# Patient Record
Sex: MALE | Race: WHITE | NOT HISPANIC OR LATINO
[De-identification: names, ages, dates, MRNs, and addresses within clinical notes are randomized per-mention and may not be internally consistent; named-entity substitution may affect disease eponyms.]

---

## 2018-04-10 ENCOUNTER — RECORD ABSTRACTING (OUTPATIENT)
Age: 53
End: 2018-04-10

## 2018-04-10 DIAGNOSIS — Z82.49 FAMILY HISTORY OF ISCHEMIC HEART DISEASE AND OTHER DISEASES OF THE CIRCULATORY SYSTEM: ICD-10-CM

## 2018-04-10 DIAGNOSIS — E78.2 MIXED HYPERLIPIDEMIA: ICD-10-CM

## 2018-04-10 DIAGNOSIS — Z87.09 PERSONAL HISTORY OF OTHER DISEASES OF THE RESPIRATORY SYSTEM: ICD-10-CM

## 2018-04-10 DIAGNOSIS — R53.83 OTHER MALAISE: ICD-10-CM

## 2018-04-10 DIAGNOSIS — Z87.891 PERSONAL HISTORY OF NICOTINE DEPENDENCE: ICD-10-CM

## 2018-04-10 DIAGNOSIS — R53.81 OTHER MALAISE: ICD-10-CM

## 2018-04-10 DIAGNOSIS — Z86.39 PERSONAL HISTORY OF OTHER ENDOCRINE, NUTRITIONAL AND METABOLIC DISEASE: ICD-10-CM

## 2018-04-13 ENCOUNTER — NON-APPOINTMENT (OUTPATIENT)
Age: 53
End: 2018-04-13

## 2018-04-13 ENCOUNTER — APPOINTMENT (OUTPATIENT)
Dept: FAMILY MEDICINE | Facility: CLINIC | Age: 53
End: 2018-04-13
Payer: COMMERCIAL

## 2018-04-13 VITALS
HEART RATE: 82 BPM | WEIGHT: 197 LBS | TEMPERATURE: 98 F | HEIGHT: 66 IN | BODY MASS INDEX: 31.66 KG/M2 | RESPIRATION RATE: 16 BRPM | DIASTOLIC BLOOD PRESSURE: 90 MMHG | OXYGEN SATURATION: 98 % | SYSTOLIC BLOOD PRESSURE: 142 MMHG

## 2018-04-13 VITALS — SYSTOLIC BLOOD PRESSURE: 130 MMHG | DIASTOLIC BLOOD PRESSURE: 80 MMHG

## 2018-04-13 LAB
ALBUMIN: 30
BILIRUB UR QL STRIP: NORMAL
CLARITY UR: CLEAR
COLLECTION METHOD: NORMAL
CREATININE: 300
GLUCOSE UR-MCNC: NORMAL
HCG UR QL: 0.2 EU/DL
HGB UR QL STRIP.AUTO: NORMAL
KETONES UR-MCNC: NORMAL
LEUKOCYTE ESTERASE UR QL STRIP: NORMAL
MICROALBUMIN/CREAT UR TEST STR-RTO: <30
NITRITE UR QL STRIP: NORMAL
PH UR STRIP: 8.5
PROT UR STRIP-MCNC: NORMAL
SP GR UR STRIP: 1.01

## 2018-04-13 PROCEDURE — 99215 OFFICE O/P EST HI 40 MIN: CPT | Mod: 25

## 2018-04-13 PROCEDURE — 94010 BREATHING CAPACITY TEST: CPT

## 2018-04-13 PROCEDURE — 93000 ELECTROCARDIOGRAM COMPLETE: CPT

## 2018-04-13 PROCEDURE — 81003 URINALYSIS AUTO W/O SCOPE: CPT | Mod: QW

## 2018-04-13 PROCEDURE — 81002 URINALYSIS NONAUTO W/O SCOPE: CPT

## 2018-04-13 PROCEDURE — 82044 UR ALBUMIN SEMIQUANTITATIVE: CPT | Mod: QW

## 2018-04-13 PROCEDURE — 82570 ASSAY OF URINE CREATININE: CPT | Mod: QW

## 2018-04-13 PROCEDURE — 36415 COLL VENOUS BLD VENIPUNCTURE: CPT

## 2018-04-14 LAB
ALBUMIN SERPL ELPH-MCNC: 4.9 G/DL
ALP BLD-CCNC: 80 U/L
ALT SERPL-CCNC: 24 U/L
ANION GAP SERPL CALC-SCNC: 16 MMOL/L
AST SERPL-CCNC: 21 U/L
BASOPHILS # BLD AUTO: 0.04 K/UL
BASOPHILS NFR BLD AUTO: 0.5 %
BILIRUB SERPL-MCNC: 0.5 MG/DL
BUN SERPL-MCNC: 13 MG/DL
CALCIUM SERPL-MCNC: 9.4 MG/DL
CHLORIDE SERPL-SCNC: 101 MMOL/L
CHOLEST SERPL-MCNC: 177 MG/DL
CHOLEST/HDLC SERPL: 4.5 RATIO
CO2 SERPL-SCNC: 26 MMOL/L
CREAT SERPL-MCNC: 0.83 MG/DL
EOSINOPHIL # BLD AUTO: 0.09 K/UL
EOSINOPHIL NFR BLD AUTO: 1.1 %
FOLATE SERPL-MCNC: >20 NG/ML
GLUCOSE SERPL-MCNC: 87 MG/DL
HBA1C MFR BLD HPLC: 5.4 %
HCT VFR BLD CALC: 50.4 %
HDLC SERPL-MCNC: 39 MG/DL
HGB BLD-MCNC: 16.8 G/DL
IMM GRANULOCYTES NFR BLD AUTO: 0.2 %
LDLC SERPL CALC-MCNC: 96 MG/DL
LYMPHOCYTES # BLD AUTO: 1.91 K/UL
LYMPHOCYTES NFR BLD AUTO: 23.8 %
MAN DIFF?: NORMAL
MCHC RBC-ENTMCNC: 29.1 PG
MCHC RBC-ENTMCNC: 33.3 GM/DL
MCV RBC AUTO: 87.2 FL
MONOCYTES # BLD AUTO: 0.74 K/UL
MONOCYTES NFR BLD AUTO: 9.2 %
NEUTROPHILS # BLD AUTO: 5.23 K/UL
NEUTROPHILS NFR BLD AUTO: 65.2 %
PLATELET # BLD AUTO: 245 K/UL
POTASSIUM SERPL-SCNC: 4.4 MMOL/L
PROT SERPL-MCNC: 7.5 G/DL
PSA FREE SERPL-MCNC: 0.13 NG/ML
RBC # BLD: 5.78 M/UL
RBC # FLD: 14 %
SODIUM SERPL-SCNC: 143 MMOL/L
T3FREE SERPL-MCNC: 4.16 PG/ML
T4 FREE SERPL-MCNC: 1.4 NG/DL
TRIGL SERPL-MCNC: 210 MG/DL
TSH SERPL-ACNC: 4.93 UIU/ML
VIT B12 SERPL-MCNC: 921 PG/ML
WBC # FLD AUTO: 8.03 K/UL

## 2018-04-19 ENCOUNTER — APPOINTMENT (OUTPATIENT)
Dept: FAMILY MEDICINE | Facility: CLINIC | Age: 53
End: 2018-04-19
Payer: COMMERCIAL

## 2018-04-19 VITALS
BODY MASS INDEX: 31.66 KG/M2 | HEIGHT: 66 IN | TEMPERATURE: 98.2 F | DIASTOLIC BLOOD PRESSURE: 78 MMHG | RESPIRATION RATE: 12 BRPM | SYSTOLIC BLOOD PRESSURE: 128 MMHG | OXYGEN SATURATION: 98 % | WEIGHT: 197 LBS | HEART RATE: 100 BPM

## 2018-04-19 PROCEDURE — 99213 OFFICE O/P EST LOW 20 MIN: CPT

## 2018-04-19 PROCEDURE — 76536 US EXAM OF HEAD AND NECK: CPT

## 2018-05-22 ENCOUNTER — APPOINTMENT (OUTPATIENT)
Dept: FAMILY MEDICINE | Facility: CLINIC | Age: 53
End: 2018-05-22
Payer: COMMERCIAL

## 2018-05-22 VITALS
HEART RATE: 72 BPM | HEIGHT: 66 IN | OXYGEN SATURATION: 98 % | WEIGHT: 197 LBS | TEMPERATURE: 98.1 F | RESPIRATION RATE: 12 BRPM | SYSTOLIC BLOOD PRESSURE: 142 MMHG | DIASTOLIC BLOOD PRESSURE: 72 MMHG | BODY MASS INDEX: 31.66 KG/M2

## 2018-05-22 PROCEDURE — 99213 OFFICE O/P EST LOW 20 MIN: CPT | Mod: 25

## 2018-05-22 PROCEDURE — 36415 COLL VENOUS BLD VENIPUNCTURE: CPT

## 2018-05-22 NOTE — PHYSICAL EXAM
[No Acute Distress] : no acute distress [Well Nourished] : well nourished [Well Developed] : well developed [Thyroid Normal, No Nodules] : the thyroid was normal and there were no nodules present [No Respiratory Distress] : no respiratory distress  [Clear to Auscultation] : lungs were clear to auscultation bilaterally [No Accessory Muscle Use] : no accessory muscle use [Normal Rate] : normal rate  [Regular Rhythm] : with a regular rhythm [Normal S1, S2] : normal S1 and S2 [Normal Affect] : the affect was normal [Normal Insight/Judgement] : insight and judgment were intact

## 2018-05-22 NOTE — HISTORY OF PRESENT ILLNESS
[FreeTextEntry1] : pt here for a recheck  [de-identified] : 53 year old male here for recheck of thyroid and to review US denies complaint.

## 2018-05-22 NOTE — PLAN
[FreeTextEntry1] : labs repeated.\par labs reviewed\par meds as directed\par will call with results

## 2018-05-24 LAB
25(OH)D3 SERPL-MCNC: 51.3 NG/ML
TSH SERPL-ACNC: 4.92 UIU/ML

## 2018-05-29 ENCOUNTER — RX RENEWAL (OUTPATIENT)
Age: 53
End: 2018-05-29

## 2018-05-30 ENCOUNTER — RX RENEWAL (OUTPATIENT)
Age: 53
End: 2018-05-30

## 2018-07-13 ENCOUNTER — RX RENEWAL (OUTPATIENT)
Age: 53
End: 2018-07-13

## 2018-08-21 ENCOUNTER — APPOINTMENT (OUTPATIENT)
Dept: FAMILY MEDICINE | Facility: CLINIC | Age: 53
End: 2018-08-21
Payer: COMMERCIAL

## 2018-08-21 VITALS
SYSTOLIC BLOOD PRESSURE: 144 MMHG | OXYGEN SATURATION: 97 % | TEMPERATURE: 98.2 F | WEIGHT: 197 LBS | DIASTOLIC BLOOD PRESSURE: 88 MMHG | RESPIRATION RATE: 14 BRPM | HEART RATE: 95 BPM | HEIGHT: 66 IN | BODY MASS INDEX: 31.66 KG/M2

## 2018-08-21 PROCEDURE — 99213 OFFICE O/P EST LOW 20 MIN: CPT | Mod: 25

## 2018-08-21 PROCEDURE — 36415 COLL VENOUS BLD VENIPUNCTURE: CPT

## 2018-08-21 NOTE — HISTORY OF PRESENT ILLNESS
[FreeTextEntry1] : Rx refill on Levothyroxine.   [de-identified] : 53 year old male here for med refills. he is doing well and denies complaint. He has not gone for a colonoscopy and states that he does not want to .

## 2018-08-21 NOTE — PLAN
[FreeTextEntry1] : meds as directed\par cologuard. risk of no screening discussed\par labs drawn\par will call with lab results

## 2018-08-22 LAB
ALBUMIN SERPL ELPH-MCNC: 4.7 G/DL
ALP BLD-CCNC: 85 U/L
ALT SERPL-CCNC: 22 U/L
ANION GAP SERPL CALC-SCNC: 13 MMOL/L
AST SERPL-CCNC: 20 U/L
BASOPHILS # BLD AUTO: 0.02 K/UL
BASOPHILS NFR BLD AUTO: 0.3 %
BILIRUB SERPL-MCNC: 0.4 MG/DL
BUN SERPL-MCNC: 14 MG/DL
CALCIUM SERPL-MCNC: 9.7 MG/DL
CHLORIDE SERPL-SCNC: 101 MMOL/L
CHOLEST SERPL-MCNC: 162 MG/DL
CHOLEST/HDLC SERPL: 4.9 RATIO
CO2 SERPL-SCNC: 27 MMOL/L
CREAT SERPL-MCNC: 0.86 MG/DL
EOSINOPHIL # BLD AUTO: 0.05 K/UL
EOSINOPHIL NFR BLD AUTO: 0.7 %
GLUCOSE SERPL-MCNC: 138 MG/DL
HBA1C MFR BLD HPLC: 5.6 %
HCT VFR BLD CALC: 47.8 %
HDLC SERPL-MCNC: 33 MG/DL
HGB BLD-MCNC: 16 G/DL
IMM GRANULOCYTES NFR BLD AUTO: 0.3 %
LDLC SERPL CALC-MCNC: 78 MG/DL
LYMPHOCYTES # BLD AUTO: 1.88 K/UL
LYMPHOCYTES NFR BLD AUTO: 25.2 %
MAN DIFF?: NORMAL
MCHC RBC-ENTMCNC: 29.1 PG
MCHC RBC-ENTMCNC: 33.5 GM/DL
MCV RBC AUTO: 87.1 FL
MONOCYTES # BLD AUTO: 0.38 K/UL
MONOCYTES NFR BLD AUTO: 5.1 %
NEUTROPHILS # BLD AUTO: 5.1 K/UL
NEUTROPHILS NFR BLD AUTO: 68.4 %
PLATELET # BLD AUTO: 251 K/UL
POTASSIUM SERPL-SCNC: 4.1 MMOL/L
PROT SERPL-MCNC: 6.8 G/DL
RBC # BLD: 5.49 M/UL
RBC # FLD: 13.2 %
SODIUM SERPL-SCNC: 141 MMOL/L
T3 SERPL-MCNC: 114 NG/DL
T4 SERPL-MCNC: 7.4 UG/DL
TRIGL SERPL-MCNC: 253 MG/DL
TSH SERPL-ACNC: 2.61 UIU/ML
WBC # FLD AUTO: 7.45 K/UL

## 2018-11-14 ENCOUNTER — APPOINTMENT (OUTPATIENT)
Dept: FAMILY MEDICINE | Facility: CLINIC | Age: 53
End: 2018-11-14
Payer: COMMERCIAL

## 2018-11-14 VITALS
HEART RATE: 91 BPM | HEIGHT: 66 IN | RESPIRATION RATE: 16 BRPM | SYSTOLIC BLOOD PRESSURE: 124 MMHG | OXYGEN SATURATION: 98 % | DIASTOLIC BLOOD PRESSURE: 82 MMHG | TEMPERATURE: 97.9 F | BODY MASS INDEX: 31.18 KG/M2 | WEIGHT: 194 LBS

## 2018-11-14 PROCEDURE — 36415 COLL VENOUS BLD VENIPUNCTURE: CPT

## 2018-11-14 PROCEDURE — 99214 OFFICE O/P EST MOD 30 MIN: CPT | Mod: 25

## 2018-11-14 NOTE — HISTORY OF PRESENT ILLNESS
[FreeTextEntry1] : refill levothyroxine and simvastatin\par fbw\par [facility]  [de-identified] : the pt presents for FBW and refill of thyroid and cholesterol lowering medication. He feels well

## 2018-11-16 LAB
ALBUMIN SERPL ELPH-MCNC: 4.5 G/DL
ALP BLD-CCNC: 85 U/L
ALT SERPL-CCNC: 23 U/L
ANION GAP SERPL CALC-SCNC: 12 MMOL/L
AST SERPL-CCNC: 18 U/L
BILIRUB SERPL-MCNC: 0.5 MG/DL
BUN SERPL-MCNC: 13 MG/DL
CALCIUM SERPL-MCNC: 9.3 MG/DL
CHLORIDE SERPL-SCNC: 104 MMOL/L
CHOLEST SERPL-MCNC: 174 MG/DL
CHOLEST/HDLC SERPL: 5 RATIO
CO2 SERPL-SCNC: 27 MMOL/L
CREAT SERPL-MCNC: 0.77 MG/DL
GLUCOSE SERPL-MCNC: 102 MG/DL
HDLC SERPL-MCNC: 35 MG/DL
LDLC SERPL CALC-MCNC: 94 MG/DL
POTASSIUM SERPL-SCNC: 4.4 MMOL/L
PROT SERPL-MCNC: 6.9 G/DL
SODIUM SERPL-SCNC: 143 MMOL/L
T4 SERPL-MCNC: 8.5 UG/DL
TRIGL SERPL-MCNC: 225 MG/DL
TSH SERPL-ACNC: 0.27 UIU/ML

## 2018-11-21 LAB
TESTOST BND SERPL-MCNC: 17.5 PG/ML
TESTOST SERPL-MCNC: 228.3 NG/DL

## 2019-02-15 ENCOUNTER — RX RENEWAL (OUTPATIENT)
Age: 54
End: 2019-02-15

## 2019-02-20 ENCOUNTER — RX RENEWAL (OUTPATIENT)
Age: 54
End: 2019-02-20

## 2019-02-21 ENCOUNTER — NON-APPOINTMENT (OUTPATIENT)
Age: 54
End: 2019-02-21

## 2019-02-21 ENCOUNTER — APPOINTMENT (OUTPATIENT)
Dept: FAMILY MEDICINE | Facility: CLINIC | Age: 54
End: 2019-02-21
Payer: COMMERCIAL

## 2019-02-21 VITALS
WEIGHT: 190 LBS | DIASTOLIC BLOOD PRESSURE: 90 MMHG | HEART RATE: 67 BPM | HEIGHT: 66 IN | TEMPERATURE: 97.8 F | BODY MASS INDEX: 30.53 KG/M2 | RESPIRATION RATE: 16 BRPM | OXYGEN SATURATION: 97 % | SYSTOLIC BLOOD PRESSURE: 140 MMHG

## 2019-02-21 DIAGNOSIS — Z82.49 FAMILY HISTORY OF ISCHEMIC HEART DISEASE AND OTHER DISEASES OF THE CIRCULATORY SYSTEM: ICD-10-CM

## 2019-02-21 DIAGNOSIS — Z78.9 OTHER SPECIFIED HEALTH STATUS: ICD-10-CM

## 2019-02-21 PROCEDURE — 81003 URINALYSIS AUTO W/O SCOPE: CPT | Mod: NC,QW

## 2019-02-21 PROCEDURE — 36415 COLL VENOUS BLD VENIPUNCTURE: CPT

## 2019-02-21 PROCEDURE — 73030 X-RAY EXAM OF SHOULDER: CPT | Mod: 59,LT

## 2019-02-21 PROCEDURE — 93000 ELECTROCARDIOGRAM COMPLETE: CPT

## 2019-02-21 PROCEDURE — 99215 OFFICE O/P EST HI 40 MIN: CPT | Mod: 25

## 2019-02-21 NOTE — HISTORY OF PRESENT ILLNESS
[FreeTextEntry1] : Pt is here for BW and to discuss things with the physician Rx renewal: Levothyoxine 25 MG , simvastatin 20 MG\[facility]  [de-identified] : The pt presents for FBW and refill of thyroid, chol meds. He states he has white coat syndrome and is nervous\par He c/o right foot pain at work x weeks and intermittent mild to moderate left shoulder pain with lifting x months. + improved w Aleve\par Pt also c/o urinary frequency  every february. He denies dysuria

## 2019-02-21 NOTE — PHYSICAL EXAM
[No Acute Distress] : no acute distress [Well Nourished] : well nourished [Well Developed] : well developed [Well-Appearing] : well-appearing [Normal Sclera/Conjunctiva] : normal sclera/conjunctiva [PERRL] : pupils equal round and reactive to light [EOMI] : extraocular movements intact [Normal Outer Ear/Nose] : the outer ears and nose were normal in appearance [Normal Oropharynx] : the oropharynx was normal [No JVD] : no jugular venous distention [Supple] : supple [No Lymphadenopathy] : no lymphadenopathy [Thyroid Normal, No Nodules] : the thyroid was normal and there were no nodules present [No Respiratory Distress] : no respiratory distress  [Clear to Auscultation] : lungs were clear to auscultation bilaterally [No Accessory Muscle Use] : no accessory muscle use [Normal Rate] : normal rate  [Regular Rhythm] : with a regular rhythm [Normal S1, S2] : normal S1 and S2 [No Murmur] : no murmur heard [No Carotid Bruits] : no carotid bruits [No Abdominal Bruit] : a ~M bruit was not heard ~T in the abdomen [No Varicosities] : no varicosities [Pedal Pulses Present] : the pedal pulses are present [No Edema] : there was no peripheral edema [No Extremity Clubbing/Cyanosis] : no extremity clubbing/cyanosis [No Palpable Aorta] : no palpable aorta [Normal Posterior Cervical Nodes] : no posterior cervical lymphadenopathy [Normal Anterior Cervical Nodes] : no anterior cervical lymphadenopathy [No CVA Tenderness] : no CVA  tenderness [No Spinal Tenderness] : no spinal tenderness [No Joint Swelling] : no joint swelling [Grossly Normal Strength/Tone] : grossly normal strength/tone [No Rash] : no rash [Normal Gait] : normal gait [Coordination Grossly Intact] : coordination grossly intact [No Focal Deficits] : no focal deficits [Normal Affect] : the affect was normal [Normal Insight/Judgement] : insight and judgment were intact [de-identified] : repeat /96 [de-identified] : mild anterior left shr tenderness. FROM good strenghth

## 2019-02-22 ENCOUNTER — RX RENEWAL (OUTPATIENT)
Age: 54
End: 2019-02-22

## 2019-02-22 LAB
ALBUMIN SERPL ELPH-MCNC: 4.7 G/DL
ALP BLD-CCNC: 94 U/L
ALT SERPL-CCNC: 30 U/L
ANION GAP SERPL CALC-SCNC: 18 MMOL/L
AST SERPL-CCNC: 21 U/L
BILIRUB SERPL-MCNC: 0.4 MG/DL
BUN SERPL-MCNC: 17 MG/DL
CALCIUM SERPL-MCNC: 9.6 MG/DL
CHLORIDE SERPL-SCNC: 104 MMOL/L
CO2 SERPL-SCNC: 23 MMOL/L
CREAT SERPL-MCNC: 0.83 MG/DL
GLUCOSE SERPL-MCNC: 110 MG/DL
POTASSIUM SERPL-SCNC: 4.4 MMOL/L
PROT SERPL-MCNC: 7.1 G/DL
SODIUM SERPL-SCNC: 145 MMOL/L

## 2019-02-24 LAB
CHOLEST SERPL-MCNC: 182 MG/DL
CHOLEST/HDLC SERPL: 5.1 RATIO
HDLC SERPL-MCNC: 36 MG/DL
LDLC SERPL CALC-MCNC: 96 MG/DL
PSA SERPL-MCNC: 0.41 NG/ML
TRIGL SERPL-MCNC: 252 MG/DL

## 2019-04-13 ENCOUNTER — APPOINTMENT (OUTPATIENT)
Dept: ULTRASOUND IMAGING | Facility: CLINIC | Age: 54
End: 2019-04-13
Payer: COMMERCIAL

## 2019-04-13 PROCEDURE — 76700 US EXAM ABDOM COMPLETE: CPT

## 2019-04-19 ENCOUNTER — OUTPATIENT (OUTPATIENT)
Dept: OUTPATIENT SERVICES | Facility: HOSPITAL | Age: 54
LOS: 1 days | End: 2019-04-19

## 2019-04-26 ENCOUNTER — APPOINTMENT (OUTPATIENT)
Dept: CT IMAGING | Facility: CLINIC | Age: 54
End: 2019-04-26
Payer: COMMERCIAL

## 2019-04-26 PROCEDURE — Q9967B: CUSTOM

## 2019-04-26 PROCEDURE — 74178 CT ABD&PLV WO CNTR FLWD CNTR: CPT

## 2019-05-14 ENCOUNTER — RX RENEWAL (OUTPATIENT)
Age: 54
End: 2019-05-14

## 2019-08-19 ENCOUNTER — RX RENEWAL (OUTPATIENT)
Age: 54
End: 2019-08-19

## 2019-08-27 ENCOUNTER — APPOINTMENT (OUTPATIENT)
Dept: FAMILY MEDICINE | Facility: CLINIC | Age: 54
End: 2019-08-27
Payer: COMMERCIAL

## 2019-08-27 ENCOUNTER — LABORATORY RESULT (OUTPATIENT)
Age: 54
End: 2019-08-27

## 2019-08-27 VITALS
SYSTOLIC BLOOD PRESSURE: 138 MMHG | RESPIRATION RATE: 16 BRPM | DIASTOLIC BLOOD PRESSURE: 98 MMHG | WEIGHT: 193 LBS | OXYGEN SATURATION: 98 % | HEART RATE: 95 BPM | TEMPERATURE: 98.4 F | BODY MASS INDEX: 31.02 KG/M2 | HEIGHT: 66 IN

## 2019-08-27 PROCEDURE — 36415 COLL VENOUS BLD VENIPUNCTURE: CPT

## 2019-08-27 PROCEDURE — 99214 OFFICE O/P EST MOD 30 MIN: CPT | Mod: 25

## 2019-08-27 NOTE — HEALTH RISK ASSESSMENT
[Yes] : Yes [1 or 2 (0 pts)] : 1 or 2 (0 points) [Monthly or less (1 pt)] : Monthly or less (1 point) [No] : In the past 12 months have you used drugs other than those required for medical reasons? No [Never (0 pts)] : Never (0 points) [0] : 2) Feeling down, depressed, or hopeless: Not at all (0) [Audit-CScore] : 1

## 2019-08-27 NOTE — PHYSICAL EXAM
[No Acute Distress] : no acute distress [Well Nourished] : well nourished [Well Developed] : well developed [Well-Appearing] : well-appearing [Normal Sclera/Conjunctiva] : normal sclera/conjunctiva [PERRL] : pupils equal round and reactive to light [EOMI] : extraocular movements intact [Normal Outer Ear/Nose] : the outer ears and nose were normal in appearance [Normal Oropharynx] : the oropharynx was normal [No JVD] : no jugular venous distention [No Lymphadenopathy] : no lymphadenopathy [Supple] : supple [Thyroid Normal, No Nodules] : the thyroid was normal and there were no nodules present [No Respiratory Distress] : no respiratory distress  [No Accessory Muscle Use] : no accessory muscle use [Clear to Auscultation] : lungs were clear to auscultation bilaterally [Normal Rate] : normal rate  [Regular Rhythm] : with a regular rhythm [Normal S1, S2] : normal S1 and S2 [No Murmur] : no murmur heard [No Carotid Bruits] : no carotid bruits [No Abdominal Bruit] : a ~M bruit was not heard ~T in the abdomen [No Varicosities] : no varicosities [Pedal Pulses Present] : the pedal pulses are present [No Edema] : there was no peripheral edema [No Palpable Aorta] : no palpable aorta [No Extremity Clubbing/Cyanosis] : no extremity clubbing/cyanosis [Soft] : abdomen soft [Non Tender] : non-tender [Non-distended] : non-distended [No Masses] : no abdominal mass palpated [No HSM] : no HSM [Normal Bowel Sounds] : normal bowel sounds [Normal Posterior Cervical Nodes] : no posterior cervical lymphadenopathy [Normal Anterior Cervical Nodes] : no anterior cervical lymphadenopathy [No Spinal Tenderness] : no spinal tenderness [No CVA Tenderness] : no CVA  tenderness [No Joint Swelling] : no joint swelling [Grossly Normal Strength/Tone] : grossly normal strength/tone [No Rash] : no rash [Coordination Grossly Intact] : coordination grossly intact [No Focal Deficits] : no focal deficits [Normal Gait] : normal gait [Deep Tendon Reflexes (DTR)] : deep tendon reflexes were 2+ and symmetric [Normal Affect] : the affect was normal [Normal Insight/Judgement] : insight and judgment were intact

## 2019-08-28 LAB
ALBUMIN SERPL ELPH-MCNC: 4.8 G/DL
ALP BLD-CCNC: 96 U/L
ALT SERPL-CCNC: 24 U/L
ANION GAP SERPL CALC-SCNC: 11 MMOL/L
AST SERPL-CCNC: 19 U/L
BILIRUB SERPL-MCNC: 0.5 MG/DL
BUN SERPL-MCNC: 12 MG/DL
CALCIUM SERPL-MCNC: 9.1 MG/DL
CHLORIDE SERPL-SCNC: 103 MMOL/L
CHOLEST SERPL-MCNC: 182 MG/DL
CHOLEST/HDLC SERPL: 5.4 RATIO
CO2 SERPL-SCNC: 26 MMOL/L
CREAT SERPL-MCNC: 0.85 MG/DL
GLUCOSE SERPL-MCNC: 100 MG/DL
HDLC SERPL-MCNC: 34 MG/DL
LDLC SERPL CALC-MCNC: 106 MG/DL
POTASSIUM SERPL-SCNC: 4.1 MMOL/L
PROT SERPL-MCNC: 7.2 G/DL
SODIUM SERPL-SCNC: 140 MMOL/L
TRIGL SERPL-MCNC: 209 MG/DL
TSH SERPL-ACNC: 5.22 UIU/ML

## 2019-11-18 ENCOUNTER — RX RENEWAL (OUTPATIENT)
Age: 54
End: 2019-11-18

## 2019-11-19 ENCOUNTER — RX RENEWAL (OUTPATIENT)
Age: 54
End: 2019-11-19

## 2019-12-13 ENCOUNTER — APPOINTMENT (OUTPATIENT)
Dept: UROLOGY | Facility: CLINIC | Age: 54
End: 2019-12-13
Payer: COMMERCIAL

## 2019-12-13 VITALS
HEIGHT: 66 IN | WEIGHT: 193 LBS | TEMPERATURE: 98 F | SYSTOLIC BLOOD PRESSURE: 157 MMHG | BODY MASS INDEX: 31.02 KG/M2 | HEART RATE: 103 BPM | DIASTOLIC BLOOD PRESSURE: 90 MMHG

## 2019-12-13 LAB
BILIRUB UR QL STRIP: NEGATIVE
CLARITY UR: CLEAR
COLLECTION METHOD: NORMAL
GLUCOSE UR-MCNC: NEGATIVE
HCG UR QL: 0.2 EU/DL
HGB UR QL STRIP.AUTO: NORMAL
KETONES UR-MCNC: NEGATIVE
LEUKOCYTE ESTERASE UR QL STRIP: NEGATIVE
NITRITE UR QL STRIP: NEGATIVE
PH UR STRIP: 6.5
PROT UR STRIP-MCNC: NEGATIVE
SP GR UR STRIP: 1.02

## 2019-12-13 PROCEDURE — 99204 OFFICE O/P NEW MOD 45 MIN: CPT | Mod: 25

## 2019-12-13 PROCEDURE — 51798 US URINE CAPACITY MEASURE: CPT

## 2019-12-13 PROCEDURE — 81003 URINALYSIS AUTO W/O SCOPE: CPT | Mod: NC,QW

## 2019-12-13 RX ORDER — SILDENAFIL 20 MG/1
20 TABLET ORAL 3 TIMES DAILY
Qty: 30 | Refills: 0 | Status: COMPLETED | COMMUNITY
Start: 2018-04-13 | End: 2019-12-13

## 2019-12-13 NOTE — REVIEW OF SYSTEMS
[see HPI] : see HPI [Wake up at night to urinate  How many times?  ___] : wakes up to urinate [unfilled] times during the night [Interrupted urine stream] : interrupted urine stream [Negative] : Heme/Lymph

## 2019-12-13 NOTE — HISTORY OF PRESENT ILLNESS
[FreeTextEntry1] : Mr. DEVAN BLOCK 54 year old  M  PMH HLD, HTN, low testosterone, ED, hypothyroidism and PSH appendectomy, right inguinal hernia repair. Pt comes in bc of right lower back pain Tuesday lasted about 4 hours, then that night same pain in the middle of the night. Pt in the back has resolved. Currently pt having some urinary freq.  Nocturia 2-3 times. During the day urinates every 2-3 hours. Denies urgency. Some hesitancy and intermittency. Denies hematuria and dysuria. Denies history of kidney stones.  Urinary stream is weak. Last PSA 2/2019 0.41. UA shows small blood. \par \par PVR 21\par

## 2019-12-13 NOTE — ASSESSMENT
[FreeTextEntry1] : Mr. DEVAN BLOCK 54 year old  M  PMH HLD, HTN, low testosterone, ED, hypothyroidism and PSH appendectomy, right inguinal hernia repair. Pt comes in bc of right lower back pain Tuesday lasted about 4 hours, then that night same pain in the middle of the night. Pt in the back has resolved. Currently pt having some urinary freq.  Nocturia 2-3 times. During the day urinates every 2-3 hours. Denies urgency. Some hesitancy and intermittency. Denies hematuria and dysuria. Denies history of kidney stones.  Urinary stream is weak. Last PSA 2/2019 0.41. UA shows small blood. \par \par PVR 21\par \par Plan\par UA\par culture\par start flomax\par fu 1 month

## 2019-12-13 NOTE — PHYSICAL EXAM
[General Appearance - Well Developed] : well developed [General Appearance - Well Nourished] : well nourished [Normal Appearance] : normal appearance [Well Groomed] : well groomed [General Appearance - In No Acute Distress] : no acute distress [Edema] : no peripheral edema [Respiration, Rhythm And Depth] : normal respiratory rhythm and effort [Exaggerated Use Of Accessory Muscles For Inspiration] : no accessory muscle use [Abdomen Soft] : soft [Abdomen Tenderness] : non-tender [Costovertebral Angle Tenderness] : no ~M costovertebral angle tenderness [Scrotum] : the scrotum was normal [Urinary Bladder Findings] : the bladder was normal on palpation [Normal Station and Gait] : the gait and station were normal for the patient's age [] : no rash [No Focal Deficits] : no focal deficits [Oriented To Time, Place, And Person] : oriented to person, place, and time [Affect] : the affect was normal [Mood] : the mood was normal [Not Anxious] : not anxious

## 2019-12-16 LAB
APPEARANCE: CLEAR
BACTERIA UR CULT: NORMAL
BACTERIA: NEGATIVE
BILIRUBIN URINE: NEGATIVE
BLOOD URINE: ABNORMAL
COLOR: NORMAL
GLUCOSE QUALITATIVE U: NEGATIVE
HYALINE CASTS: 0 /LPF
KETONES URINE: NEGATIVE
LEUKOCYTE ESTERASE URINE: NEGATIVE
MICROSCOPIC-UA: NORMAL
NITRITE URINE: NEGATIVE
PH URINE: 6.5
PROTEIN URINE: NEGATIVE
RED BLOOD CELLS URINE: 2 /HPF
SPECIFIC GRAVITY URINE: 1.01
SQUAMOUS EPITHELIAL CELLS: 0 /HPF
UROBILINOGEN URINE: NORMAL
WHITE BLOOD CELLS URINE: 1 /HPF

## 2019-12-30 ENCOUNTER — RX RENEWAL (OUTPATIENT)
Age: 54
End: 2019-12-30

## 2019-12-31 ENCOUNTER — APPOINTMENT (OUTPATIENT)
Dept: FAMILY MEDICINE | Facility: CLINIC | Age: 54
End: 2019-12-31
Payer: COMMERCIAL

## 2019-12-31 ENCOUNTER — APPOINTMENT (OUTPATIENT)
Dept: ULTRASOUND IMAGING | Facility: CLINIC | Age: 54
End: 2019-12-31
Payer: COMMERCIAL

## 2019-12-31 VITALS — HEIGHT: 66 IN | WEIGHT: 199 LBS | BODY MASS INDEX: 31.98 KG/M2

## 2019-12-31 VITALS
TEMPERATURE: 98.7 F | SYSTOLIC BLOOD PRESSURE: 132 MMHG | HEART RATE: 82 BPM | DIASTOLIC BLOOD PRESSURE: 96 MMHG | RESPIRATION RATE: 16 BRPM

## 2019-12-31 PROCEDURE — 36415 COLL VENOUS BLD VENIPUNCTURE: CPT

## 2019-12-31 PROCEDURE — 99214 OFFICE O/P EST MOD 30 MIN: CPT | Mod: 25

## 2019-12-31 PROCEDURE — 76775 US EXAM ABDO BACK WALL LIM: CPT

## 2019-12-31 NOTE — HISTORY OF PRESENT ILLNESS
[FreeTextEntry1] : 3 mo follow up [de-identified] : Jonny presents for BP check on olmesartan 20mg HS. Needs FBW for chol and repeat TFT\par He states he recently passed 2 kidney stones from right side

## 2019-12-31 NOTE — PHYSICAL EXAM
[No Acute Distress] : no acute distress [Well Nourished] : well nourished [Well Developed] : well developed [Well-Appearing] : well-appearing [Normal Sclera/Conjunctiva] : normal sclera/conjunctiva [PERRL] : pupils equal round and reactive to light [EOMI] : extraocular movements intact [Normal Outer Ear/Nose] : the outer ears and nose were normal in appearance [Normal Oropharynx] : the oropharynx was normal [No JVD] : no jugular venous distention [No Lymphadenopathy] : no lymphadenopathy [Supple] : supple [Thyroid Normal, No Nodules] : the thyroid was normal and there were no nodules present [No Respiratory Distress] : no respiratory distress  [No Accessory Muscle Use] : no accessory muscle use [Clear to Auscultation] : lungs were clear to auscultation bilaterally [Normal Rate] : normal rate  [Regular Rhythm] : with a regular rhythm [Normal S1, S2] : normal S1 and S2 [No Murmur] : no murmur heard [No Edema] : there was no peripheral edema [No Extremity Clubbing/Cyanosis] : no extremity clubbing/cyanosis [Normal Posterior Cervical Nodes] : no posterior cervical lymphadenopathy [Normal Anterior Cervical Nodes] : no anterior cervical lymphadenopathy [No CVA Tenderness] : no CVA  tenderness [No Spinal Tenderness] : no spinal tenderness [No Joint Swelling] : no joint swelling [Grossly Normal Strength/Tone] : grossly normal strength/tone [No Rash] : no rash [Coordination Grossly Intact] : coordination grossly intact [No Focal Deficits] : no focal deficits [Normal Gait] : normal gait [Normal Affect] : the affect was normal [Alert and Oriented x3] : oriented to person, place, and time [Normal Insight/Judgement] : insight and judgment were intact

## 2020-01-01 LAB
ALBUMIN SERPL ELPH-MCNC: 4.5 G/DL
ALP BLD-CCNC: 76 U/L
ALT SERPL-CCNC: 25 U/L
ANION GAP SERPL CALC-SCNC: 12 MMOL/L
AST SERPL-CCNC: 20 U/L
BASOPHILS # BLD AUTO: 0.06 K/UL
BASOPHILS NFR BLD AUTO: 0.7 %
BILIRUB SERPL-MCNC: 0.4 MG/DL
BUN SERPL-MCNC: 13 MG/DL
CALCIUM SERPL-MCNC: 9.1 MG/DL
CHLORIDE SERPL-SCNC: 104 MMOL/L
CHOLEST SERPL-MCNC: 173 MG/DL
CHOLEST/HDLC SERPL: 4.6 RATIO
CO2 SERPL-SCNC: 26 MMOL/L
CREAT SERPL-MCNC: 0.91 MG/DL
EOSINOPHIL # BLD AUTO: 0.18 K/UL
EOSINOPHIL NFR BLD AUTO: 2.2 %
GLUCOSE SERPL-MCNC: 88 MG/DL
HCT VFR BLD CALC: 47.8 %
HDLC SERPL-MCNC: 38 MG/DL
HGB BLD-MCNC: 15.7 G/DL
IMM GRANULOCYTES NFR BLD AUTO: 0.4 %
LDLC SERPL CALC-MCNC: 92 MG/DL
LYMPHOCYTES # BLD AUTO: 2.17 K/UL
LYMPHOCYTES NFR BLD AUTO: 26.9 %
MAN DIFF?: NORMAL
MCHC RBC-ENTMCNC: 28.8 PG
MCHC RBC-ENTMCNC: 32.8 GM/DL
MCV RBC AUTO: 87.7 FL
MONOCYTES # BLD AUTO: 0.67 K/UL
MONOCYTES NFR BLD AUTO: 8.3 %
NEUTROPHILS # BLD AUTO: 4.96 K/UL
NEUTROPHILS NFR BLD AUTO: 61.5 %
PLATELET # BLD AUTO: 271 K/UL
POTASSIUM SERPL-SCNC: 4.4 MMOL/L
PROT SERPL-MCNC: 6.5 G/DL
RBC # BLD: 5.45 M/UL
RBC # FLD: 13 %
SODIUM SERPL-SCNC: 142 MMOL/L
TRIGL SERPL-MCNC: 214 MG/DL
TSH SERPL-ACNC: 3.95 UIU/ML
WBC # FLD AUTO: 8.07 K/UL

## 2020-01-14 ENCOUNTER — APPOINTMENT (OUTPATIENT)
Dept: UROLOGY | Facility: CLINIC | Age: 55
End: 2020-01-14

## 2020-06-23 ENCOUNTER — RX RENEWAL (OUTPATIENT)
Age: 55
End: 2020-06-23

## 2020-06-24 ENCOUNTER — RX RENEWAL (OUTPATIENT)
Age: 55
End: 2020-06-24

## 2020-06-25 ENCOUNTER — RX RENEWAL (OUTPATIENT)
Age: 55
End: 2020-06-25

## 2020-07-01 ENCOUNTER — APPOINTMENT (OUTPATIENT)
Dept: FAMILY MEDICINE | Facility: CLINIC | Age: 55
End: 2020-07-01
Payer: COMMERCIAL

## 2020-07-01 ENCOUNTER — NON-APPOINTMENT (OUTPATIENT)
Age: 55
End: 2020-07-01

## 2020-07-01 VITALS
TEMPERATURE: 98.4 F | DIASTOLIC BLOOD PRESSURE: 82 MMHG | SYSTOLIC BLOOD PRESSURE: 134 MMHG | BODY MASS INDEX: 31.82 KG/M2 | HEART RATE: 84 BPM | HEIGHT: 66 IN | OXYGEN SATURATION: 98 % | WEIGHT: 198 LBS | RESPIRATION RATE: 16 BRPM

## 2020-07-01 PROCEDURE — 99214 OFFICE O/P EST MOD 30 MIN: CPT | Mod: 25

## 2020-07-01 PROCEDURE — 93000 ELECTROCARDIOGRAM COMPLETE: CPT

## 2020-07-01 PROCEDURE — 36415 COLL VENOUS BLD VENIPUNCTURE: CPT

## 2020-07-01 NOTE — PHYSICAL EXAM
[No Extremity Clubbing/Cyanosis] : no extremity clubbing/cyanosis [No Edema] : there was no peripheral edema [Normal] : no joint swelling and grossly normal strength and tone

## 2020-07-02 LAB
ALBUMIN SERPL ELPH-MCNC: 4.8 G/DL
ALP BLD-CCNC: 83 U/L
ALT SERPL-CCNC: 23 U/L
ANION GAP SERPL CALC-SCNC: 13 MMOL/L
AST SERPL-CCNC: 19 U/L
BASOPHILS # BLD AUTO: 0.06 K/UL
BASOPHILS NFR BLD AUTO: 0.7 %
BILIRUB SERPL-MCNC: 0.4 MG/DL
BUN SERPL-MCNC: 13 MG/DL
CALCIUM SERPL-MCNC: 9.3 MG/DL
CHLORIDE SERPL-SCNC: 103 MMOL/L
CHOLEST SERPL-MCNC: 166 MG/DL
CHOLEST/HDLC SERPL: 4.9 RATIO
CO2 SERPL-SCNC: 25 MMOL/L
CREAT SERPL-MCNC: 0.91 MG/DL
EOSINOPHIL # BLD AUTO: 0.11 K/UL
EOSINOPHIL NFR BLD AUTO: 1.3 %
GLUCOSE SERPL-MCNC: 92 MG/DL
HCT VFR BLD CALC: 49 %
HDLC SERPL-MCNC: 34 MG/DL
HGB BLD-MCNC: 15.9 G/DL
IMM GRANULOCYTES NFR BLD AUTO: 0.2 %
LDLC SERPL CALC-MCNC: 87 MG/DL
LYMPHOCYTES # BLD AUTO: 1.99 K/UL
LYMPHOCYTES NFR BLD AUTO: 23.9 %
MAN DIFF?: NORMAL
MCHC RBC-ENTMCNC: 29.3 PG
MCHC RBC-ENTMCNC: 32.4 GM/DL
MCV RBC AUTO: 90.2 FL
MONOCYTES # BLD AUTO: 0.69 K/UL
MONOCYTES NFR BLD AUTO: 8.3 %
NEUTROPHILS # BLD AUTO: 5.46 K/UL
NEUTROPHILS NFR BLD AUTO: 65.6 %
PLATELET # BLD AUTO: 251 K/UL
POTASSIUM SERPL-SCNC: 4.7 MMOL/L
PROT SERPL-MCNC: 6.7 G/DL
PSA SERPL-MCNC: 1.26 NG/ML
RBC # BLD: 5.43 M/UL
RBC # FLD: 13.2 %
SARS-COV-2 IGG SERPL IA-ACNC: 0.12 INDEX
SARS-COV-2 IGG SERPL QL IA: NEGATIVE
SODIUM SERPL-SCNC: 141 MMOL/L
T4 FREE SERPL-MCNC: 1.2 NG/DL
TRIGL SERPL-MCNC: 224 MG/DL
TSH SERPL-ACNC: 3.75 UIU/ML
WBC # FLD AUTO: 8.33 K/UL

## 2020-09-04 ENCOUNTER — APPOINTMENT (OUTPATIENT)
Dept: FAMILY MEDICINE | Facility: CLINIC | Age: 55
End: 2020-09-04

## 2020-09-22 ENCOUNTER — RX RENEWAL (OUTPATIENT)
Age: 55
End: 2020-09-22

## 2020-09-23 ENCOUNTER — RX RENEWAL (OUTPATIENT)
Age: 55
End: 2020-09-23

## 2020-11-03 ENCOUNTER — APPOINTMENT (OUTPATIENT)
Dept: UROLOGY | Facility: CLINIC | Age: 55
End: 2020-11-03
Payer: COMMERCIAL

## 2020-11-03 VITALS
WEIGHT: 198 LBS | DIASTOLIC BLOOD PRESSURE: 77 MMHG | BODY MASS INDEX: 31.82 KG/M2 | HEIGHT: 66 IN | TEMPERATURE: 97.5 F | HEART RATE: 96 BPM | SYSTOLIC BLOOD PRESSURE: 126 MMHG

## 2020-11-03 LAB
BILIRUB UR QL STRIP: NEGATIVE
CLARITY UR: CLEAR
COLLECTION METHOD: NORMAL
GLUCOSE UR-MCNC: NEGATIVE
HCG UR QL: 0.2 EU/DL
HGB UR QL STRIP.AUTO: NEGATIVE
KETONES UR-MCNC: NEGATIVE
LEUKOCYTE ESTERASE UR QL STRIP: NEGATIVE
NITRITE UR QL STRIP: NEGATIVE
PH UR STRIP: 6.5
PROT UR STRIP-MCNC: NEGATIVE
SP GR UR STRIP: 1.02

## 2020-11-03 PROCEDURE — 99072 ADDL SUPL MATRL&STAF TM PHE: CPT

## 2020-11-03 PROCEDURE — 99213 OFFICE O/P EST LOW 20 MIN: CPT | Mod: 25

## 2020-11-03 PROCEDURE — 51798 US URINE CAPACITY MEASURE: CPT

## 2020-11-03 PROCEDURE — 81003 URINALYSIS AUTO W/O SCOPE: CPT | Mod: NC,QW

## 2020-11-03 NOTE — HISTORY OF PRESENT ILLNESS
[FreeTextEntry1] : Pt comes in follow up LUTS not on flomax. Pt states having frequent urination. Pt was given flomax by urgent care Weds night. \par \par PVR 48

## 2020-11-10 ENCOUNTER — APPOINTMENT (OUTPATIENT)
Dept: FAMILY MEDICINE | Facility: CLINIC | Age: 55
End: 2020-11-10
Payer: COMMERCIAL

## 2020-11-10 VITALS
RESPIRATION RATE: 16 BRPM | HEIGHT: 66 IN | BODY MASS INDEX: 30.53 KG/M2 | DIASTOLIC BLOOD PRESSURE: 70 MMHG | HEART RATE: 88 BPM | SYSTOLIC BLOOD PRESSURE: 100 MMHG | WEIGHT: 190 LBS | OXYGEN SATURATION: 98 % | TEMPERATURE: 98 F

## 2020-11-10 VITALS — SYSTOLIC BLOOD PRESSURE: 96 MMHG | DIASTOLIC BLOOD PRESSURE: 60 MMHG

## 2020-11-10 DIAGNOSIS — Z87.891 PERSONAL HISTORY OF NICOTINE DEPENDENCE: ICD-10-CM

## 2020-11-10 PROCEDURE — 99214 OFFICE O/P EST MOD 30 MIN: CPT | Mod: 25

## 2020-11-10 PROCEDURE — 36415 COLL VENOUS BLD VENIPUNCTURE: CPT

## 2020-11-10 PROCEDURE — 99072 ADDL SUPL MATRL&STAF TM PHE: CPT

## 2020-11-10 NOTE — HISTORY OF PRESENT ILLNESS
[FreeTextEntry1] : refill simvastatin and olmesartan \par [facility] \par fbw [de-identified] : Jonny states he is feeling improvement with urinary frequency with the addition of Flomax x 1 week. He presents today for check of cholesterol and BP

## 2020-11-10 NOTE — PHYSICAL EXAM
[No Edema] : there was no peripheral edema [No Extremity Clubbing/Cyanosis] : no extremity clubbing/cyanosis [Normal] : no acute distress, well nourished, well developed and well-appearing

## 2020-11-11 LAB
ALBUMIN SERPL ELPH-MCNC: 4.7 G/DL
ALP BLD-CCNC: 86 U/L
ALT SERPL-CCNC: 21 U/L
ANION GAP SERPL CALC-SCNC: 11 MMOL/L
AST SERPL-CCNC: 20 U/L
BILIRUB SERPL-MCNC: 0.4 MG/DL
BUN SERPL-MCNC: 14 MG/DL
CALCIUM SERPL-MCNC: 9.1 MG/DL
CHLORIDE SERPL-SCNC: 100 MMOL/L
CHOLEST SERPL-MCNC: 197 MG/DL
CO2 SERPL-SCNC: 26 MMOL/L
CREAT SERPL-MCNC: 0.88 MG/DL
GLUCOSE SERPL-MCNC: 100 MG/DL
HDLC SERPL-MCNC: 37 MG/DL
LDLC SERPL CALC-MCNC: 119 MG/DL
NONHDLC SERPL-MCNC: 160 MG/DL
POTASSIUM SERPL-SCNC: 4.3 MMOL/L
PROT SERPL-MCNC: 6.8 G/DL
SODIUM SERPL-SCNC: 137 MMOL/L
TRIGL SERPL-MCNC: 204 MG/DL

## 2020-12-01 ENCOUNTER — APPOINTMENT (OUTPATIENT)
Dept: UROLOGY | Facility: CLINIC | Age: 55
End: 2020-12-01

## 2021-01-22 ENCOUNTER — APPOINTMENT (OUTPATIENT)
Dept: UROLOGY | Facility: CLINIC | Age: 56
End: 2021-01-22
Payer: COMMERCIAL

## 2021-01-22 VITALS
DIASTOLIC BLOOD PRESSURE: 80 MMHG | BODY MASS INDEX: 30.53 KG/M2 | TEMPERATURE: 96.5 F | HEIGHT: 66 IN | SYSTOLIC BLOOD PRESSURE: 117 MMHG | HEART RATE: 80 BPM | WEIGHT: 190 LBS

## 2021-01-22 PROCEDURE — 99214 OFFICE O/P EST MOD 30 MIN: CPT

## 2021-01-22 PROCEDURE — 99072 ADDL SUPL MATRL&STAF TM PHE: CPT

## 2021-01-22 NOTE — HISTORY OF PRESENT ILLNESS
[FreeTextEntry1] : Pt comes in follow LUTS and is on flomax and is happy with his urination. Pt unable to urinate in office. Pt does state his flow is much improved and is happy with his urination\par \par 2/2019 PSA 0.41\par 7/2/20 PSA 1.26

## 2021-01-25 ENCOUNTER — NON-APPOINTMENT (OUTPATIENT)
Age: 56
End: 2021-01-25

## 2021-01-26 ENCOUNTER — APPOINTMENT (OUTPATIENT)
Dept: FAMILY MEDICINE | Facility: CLINIC | Age: 56
End: 2021-01-26
Payer: COMMERCIAL

## 2021-01-26 VITALS
WEIGHT: 192 LBS | SYSTOLIC BLOOD PRESSURE: 110 MMHG | BODY MASS INDEX: 30.86 KG/M2 | HEIGHT: 66 IN | DIASTOLIC BLOOD PRESSURE: 78 MMHG | HEART RATE: 78 BPM | TEMPERATURE: 98 F | RESPIRATION RATE: 16 BRPM | OXYGEN SATURATION: 98 %

## 2021-01-26 DIAGNOSIS — U07.1 COVID-19: ICD-10-CM

## 2021-01-26 PROCEDURE — 36415 COLL VENOUS BLD VENIPUNCTURE: CPT

## 2021-01-26 PROCEDURE — 99214 OFFICE O/P EST MOD 30 MIN: CPT | Mod: 25

## 2021-01-26 PROCEDURE — 99072 ADDL SUPL MATRL&STAF TM PHE: CPT

## 2021-01-26 RX ORDER — TERBINAFINE HYDROCHLORIDE 250 MG/1
250 TABLET ORAL
Qty: 30 | Refills: 0 | Status: COMPLETED | COMMUNITY
Start: 2020-11-01

## 2021-01-26 RX ORDER — PHENAZOPYRIDINE HYDROCHLORIDE 200 MG/1
200 TABLET ORAL
Qty: 6 | Refills: 0 | Status: COMPLETED | COMMUNITY
Start: 2020-10-28

## 2021-01-26 RX ORDER — OLMESARTAN MEDOXOMIL 40 MG/1
40 TABLET, FILM COATED ORAL
Qty: 30 | Refills: 0 | Status: COMPLETED | COMMUNITY
Start: 2020-09-22

## 2021-01-26 NOTE — PHYSICAL EXAM
[Normal] : normal rate, regular rhythm, normal S1 and S2 and no murmur heard [Coordination Grossly Intact] : coordination grossly intact [Normal Gait] : normal gait [Alert and Oriented x3] : oriented to person, place, and time

## 2021-01-26 NOTE — HISTORY OF PRESENT ILLNESS
[FreeTextEntry1] : refill levothyroxine, olmesartan,  simvastatin\par [facility] \par [facility]  [de-identified] : Jonny has h/o Htn, Hyperlipidemia and hypothyroidism. He needs refills and blood work\par + coronavirus infection the last week of Nov 2020, all sx resolved. He self quarantined at home x 2 weeks

## 2021-01-27 LAB
ALBUMIN SERPL ELPH-MCNC: 4.3 G/DL
ALP BLD-CCNC: 77 U/L
ALT SERPL-CCNC: 23 U/L
ANION GAP SERPL CALC-SCNC: 15 MMOL/L
AST SERPL-CCNC: 17 U/L
BILIRUB SERPL-MCNC: 0.3 MG/DL
BUN SERPL-MCNC: 15 MG/DL
CALCIUM SERPL-MCNC: 8.8 MG/DL
CHLORIDE SERPL-SCNC: 103 MMOL/L
CHOLEST SERPL-MCNC: 160 MG/DL
CO2 SERPL-SCNC: 23 MMOL/L
CREAT SERPL-MCNC: 0.9 MG/DL
ESTIMATED AVERAGE GLUCOSE: 111 MG/DL
GLUCOSE SERPL-MCNC: 93 MG/DL
HBA1C MFR BLD HPLC: 5.5 %
HDLC SERPL-MCNC: 34 MG/DL
LDLC SERPL CALC-MCNC: 93 MG/DL
NONHDLC SERPL-MCNC: 126 MG/DL
POTASSIUM SERPL-SCNC: 4.2 MMOL/L
PROT SERPL-MCNC: 6.7 G/DL
SODIUM SERPL-SCNC: 141 MMOL/L
TRIGL SERPL-MCNC: 162 MG/DL
TSH SERPL-ACNC: 3.89 UIU/ML

## 2021-02-02 ENCOUNTER — APPOINTMENT (OUTPATIENT)
Dept: FAMILY MEDICINE | Facility: CLINIC | Age: 56
End: 2021-02-02

## 2021-04-23 ENCOUNTER — RX RENEWAL (OUTPATIENT)
Age: 56
End: 2021-04-23

## 2021-04-26 ENCOUNTER — APPOINTMENT (OUTPATIENT)
Dept: FAMILY MEDICINE | Facility: CLINIC | Age: 56
End: 2021-04-26
Payer: COMMERCIAL

## 2021-04-26 VITALS
RESPIRATION RATE: 16 BRPM | DIASTOLIC BLOOD PRESSURE: 74 MMHG | HEART RATE: 86 BPM | OXYGEN SATURATION: 99 % | BODY MASS INDEX: 31.24 KG/M2 | WEIGHT: 194.38 LBS | HEIGHT: 66 IN | SYSTOLIC BLOOD PRESSURE: 112 MMHG | TEMPERATURE: 97.8 F

## 2021-04-26 PROCEDURE — 99214 OFFICE O/P EST MOD 30 MIN: CPT | Mod: 25

## 2021-04-26 PROCEDURE — 36415 COLL VENOUS BLD VENIPUNCTURE: CPT

## 2021-04-26 PROCEDURE — 99072 ADDL SUPL MATRL&STAF TM PHE: CPT

## 2021-04-26 NOTE — HISTORY OF PRESENT ILLNESS
[FreeTextEntry1] : 3M follow up \par fbw\par needs refills on levothyroxine, olmesartan and simvastatin \par [facility]  [de-identified] : Jonny feels well and has received his first Pfizer covid vaccine\par He is fasting for blood work and needs refill of htn, chol and thyroid medications

## 2021-04-26 NOTE — PHYSICAL EXAM
[No Edema] : there was no peripheral edema [No Extremity Clubbing/Cyanosis] : no extremity clubbing/cyanosis [Normal] : normal gait, coordination grossly intact, no focal deficits and deep tendon reflexes were 2+ and symmetric [Alert and Oriented x3] : oriented to person, place, and time

## 2021-04-27 LAB
ALBUMIN SERPL ELPH-MCNC: 4.5 G/DL
ALP BLD-CCNC: 82 U/L
ALT SERPL-CCNC: 20 U/L
ANION GAP SERPL CALC-SCNC: 11 MMOL/L
AST SERPL-CCNC: 19 U/L
BASOPHILS # BLD AUTO: 0.05 K/UL
BASOPHILS NFR BLD AUTO: 0.7 %
BILIRUB SERPL-MCNC: 0.4 MG/DL
BUN SERPL-MCNC: 15 MG/DL
CALCIUM SERPL-MCNC: 9.1 MG/DL
CHLORIDE SERPL-SCNC: 105 MMOL/L
CHOLEST SERPL-MCNC: 165 MG/DL
CO2 SERPL-SCNC: 25 MMOL/L
CREAT SERPL-MCNC: 0.93 MG/DL
EOSINOPHIL # BLD AUTO: 0.12 K/UL
EOSINOPHIL NFR BLD AUTO: 1.7 %
GLUCOSE SERPL-MCNC: 101 MG/DL
HCT VFR BLD CALC: 49.7 %
HDLC SERPL-MCNC: 35 MG/DL
HGB BLD-MCNC: 16.2 G/DL
IMM GRANULOCYTES NFR BLD AUTO: 0.1 %
LDLC SERPL CALC-MCNC: 93 MG/DL
LYMPHOCYTES # BLD AUTO: 1.88 K/UL
LYMPHOCYTES NFR BLD AUTO: 27 %
MAN DIFF?: NORMAL
MCHC RBC-ENTMCNC: 28.5 PG
MCHC RBC-ENTMCNC: 32.6 GM/DL
MCV RBC AUTO: 87.5 FL
MONOCYTES # BLD AUTO: 0.58 K/UL
MONOCYTES NFR BLD AUTO: 8.3 %
NEUTROPHILS # BLD AUTO: 4.33 K/UL
NEUTROPHILS NFR BLD AUTO: 62.2 %
NONHDLC SERPL-MCNC: 131 MG/DL
PLATELET # BLD AUTO: 237 K/UL
POTASSIUM SERPL-SCNC: 4.4 MMOL/L
PROT SERPL-MCNC: 6.8 G/DL
RBC # BLD: 5.68 M/UL
RBC # FLD: 13.2 %
SODIUM SERPL-SCNC: 140 MMOL/L
T4 FREE SERPL-MCNC: 1.1 NG/DL
TRIGL SERPL-MCNC: 185 MG/DL
TSH SERPL-ACNC: 4.26 UIU/ML
WBC # FLD AUTO: 6.97 K/UL

## 2021-07-22 ENCOUNTER — RX RENEWAL (OUTPATIENT)
Age: 56
End: 2021-07-22

## 2021-08-03 ENCOUNTER — NON-APPOINTMENT (OUTPATIENT)
Age: 56
End: 2021-08-03

## 2021-08-03 ENCOUNTER — APPOINTMENT (OUTPATIENT)
Dept: FAMILY MEDICINE | Facility: CLINIC | Age: 56
End: 2021-08-03
Payer: COMMERCIAL

## 2021-08-03 VITALS
BODY MASS INDEX: 31.66 KG/M2 | HEIGHT: 66 IN | HEART RATE: 86 BPM | SYSTOLIC BLOOD PRESSURE: 120 MMHG | OXYGEN SATURATION: 98 % | RESPIRATION RATE: 16 BRPM | DIASTOLIC BLOOD PRESSURE: 70 MMHG | WEIGHT: 197 LBS | TEMPERATURE: 97.6 F

## 2021-08-03 PROCEDURE — 93000 ELECTROCARDIOGRAM COMPLETE: CPT

## 2021-08-03 PROCEDURE — 36415 COLL VENOUS BLD VENIPUNCTURE: CPT

## 2021-08-03 PROCEDURE — 99214 OFFICE O/P EST MOD 30 MIN: CPT | Mod: 25

## 2021-08-03 NOTE — HEALTH RISK ASSESSMENT
[Yes] : Yes [Monthly or less (1 pt)] : Monthly or less (1 point) [1 or 2 (0 pts)] : 1 or 2 (0 points) [Never (0 pts)] : Never (0 points) [No] : In the past 12 months have you used drugs other than those required for medical reasons? No [0] : 2) Feeling down, depressed, or hopeless: Not at all (0) [] : No [YearQuit] : 17 years ago [Audit-CScore] : 1 [[id number]] : 0

## 2021-08-03 NOTE — HISTORY OF PRESENT ILLNESS
[FreeTextEntry1] : DEVAN is being seen for medication renewals of Olmesartan, Simvastatin and Levothyroxine\par FBW\par [facility]  [de-identified] : Jonny states he feels great and presents for blood work/refill of htn, thyroid and cholesterol medications

## 2021-08-03 NOTE — PHYSICAL EXAM
[No Lymphadenopathy] : no lymphadenopathy [Supple] : supple [No Carotid Bruits] : no carotid bruits [No Edema] : there was no peripheral edema [No Extremity Clubbing/Cyanosis] : no extremity clubbing/cyanosis [Alert and Oriented x3] : oriented to person, place, and time [Normal] : affect was normal and insight and judgment were intact

## 2021-08-04 LAB
ALBUMIN SERPL ELPH-MCNC: 4.6 G/DL
ALP BLD-CCNC: 85 U/L
ALT SERPL-CCNC: 18 U/L
ANION GAP SERPL CALC-SCNC: 14 MMOL/L
AST SERPL-CCNC: 17 U/L
BILIRUB SERPL-MCNC: 0.5 MG/DL
BUN SERPL-MCNC: 15 MG/DL
CALCIUM SERPL-MCNC: 9 MG/DL
CHLORIDE SERPL-SCNC: 102 MMOL/L
CHOLEST SERPL-MCNC: 169 MG/DL
CO2 SERPL-SCNC: 26 MMOL/L
CREAT SERPL-MCNC: 0.93 MG/DL
GLUCOSE SERPL-MCNC: 95 MG/DL
HDLC SERPL-MCNC: 34 MG/DL
LDLC SERPL CALC-MCNC: 98 MG/DL
NONHDLC SERPL-MCNC: 135 MG/DL
POTASSIUM SERPL-SCNC: 4.4 MMOL/L
PROT SERPL-MCNC: 6.8 G/DL
SODIUM SERPL-SCNC: 143 MMOL/L
T4 FREE SERPL-MCNC: 1.2 NG/DL
TRIGL SERPL-MCNC: 185 MG/DL
TSH SERPL-ACNC: 4.04 UIU/ML

## 2021-10-27 ENCOUNTER — RX RENEWAL (OUTPATIENT)
Age: 56
End: 2021-10-27

## 2022-02-08 ENCOUNTER — RX RENEWAL (OUTPATIENT)
Age: 57
End: 2022-02-08

## 2022-04-04 ENCOUNTER — RX RENEWAL (OUTPATIENT)
Age: 57
End: 2022-04-04

## 2022-04-06 ENCOUNTER — NON-APPOINTMENT (OUTPATIENT)
Age: 57
End: 2022-04-06

## 2022-04-20 ENCOUNTER — LABORATORY RESULT (OUTPATIENT)
Age: 57
End: 2022-04-20

## 2022-04-20 ENCOUNTER — NON-APPOINTMENT (OUTPATIENT)
Age: 57
End: 2022-04-20

## 2022-04-20 ENCOUNTER — APPOINTMENT (OUTPATIENT)
Dept: FAMILY MEDICINE | Facility: CLINIC | Age: 57
End: 2022-04-20
Payer: COMMERCIAL

## 2022-04-20 VITALS
WEIGHT: 198 LBS | HEIGHT: 66 IN | DIASTOLIC BLOOD PRESSURE: 82 MMHG | HEART RATE: 90 BPM | OXYGEN SATURATION: 97 % | RESPIRATION RATE: 18 BRPM | TEMPERATURE: 97.8 F | SYSTOLIC BLOOD PRESSURE: 132 MMHG | BODY MASS INDEX: 31.82 KG/M2

## 2022-04-20 DIAGNOSIS — Z00.00 ENCOUNTER FOR GENERAL ADULT MEDICAL EXAMINATION W/OUT ABNORMAL FINDINGS: ICD-10-CM

## 2022-04-20 LAB
BILIRUB UR QL STRIP: NEGATIVE
CLARITY UR: CLEAR
COLLECTION METHOD: NORMAL
GLUCOSE UR-MCNC: NEGATIVE
HCG UR QL: 0.2 EU/DL
HGB UR QL STRIP.AUTO: NEGATIVE
KETONES UR-MCNC: NEGATIVE
LEUKOCYTE ESTERASE UR QL STRIP: NEGATIVE
NITRITE UR QL STRIP: NEGATIVE
PH UR STRIP: 7.5
PROT UR STRIP-MCNC: NORMAL
SP GR UR STRIP: 1.02

## 2022-04-20 PROCEDURE — 93000 ELECTROCARDIOGRAM COMPLETE: CPT | Mod: 59

## 2022-04-20 PROCEDURE — 99396 PREV VISIT EST AGE 40-64: CPT | Mod: 25

## 2022-04-20 PROCEDURE — 36415 COLL VENOUS BLD VENIPUNCTURE: CPT

## 2022-04-20 PROCEDURE — 81003 URINALYSIS AUTO W/O SCOPE: CPT | Mod: NC,QW

## 2022-04-20 PROCEDURE — G0442 ANNUAL ALCOHOL SCREEN 15 MIN: CPT

## 2022-04-20 NOTE — HISTORY OF PRESENT ILLNESS
[FreeTextEntry1] : CPE and fasting labs. Refills on Levothyroxine, Olmesartan, Simvastatin, Tamsulosin, and Sildenafil.  Discuss excessive belching.\par NKDA\[facility] [de-identified] : Jonny presents for CPE\par he c/o belching x 2 weeks w/o epigastric burning\par He states his mother had  of an MI at age 49. His last stress test was > 10 years ago

## 2022-04-20 NOTE — HEALTH RISK ASSESSMENT
[Good] : ~his/her~ current health as good [Very Good] : ~his/her~  mood as very good [Yes] : Yes [Former] : Former [2 - 4 times a month (2 pts)] : 2-4 times a month (2 points) [1 or 2 (0 pts)] : 1 or 2 (0 points) [Never (0 pts)] : Never (0 points) [No falls in past year] : Patient reported no falls in the past year [No] : In the past 12 months have you used drugs other than those required for medical reasons? No [0] : 2) Feeling down, depressed, or hopeless: Not at all (0) [FreeTextEntry1] : CPE and discuss excessive belching [de-identified] : Dr. Rodriguez - urology [YearQuit] : 2002 [Audit-CScore] : 2 [[id number]] : 0

## 2022-04-20 NOTE — PLAN
[FreeTextEntry1] : Blood drawn in office\par low fat diet recommended. exercise ( walking, jogging ) recommended 30-40 minutes daily a minimum of 4 times per week.\par

## 2022-04-20 NOTE — PHYSICAL EXAM
[No Edema] : there was no peripheral edema [No Extremity Clubbing/Cyanosis] : no extremity clubbing/cyanosis [Normal] : normal gait, coordination grossly intact, no focal deficits and deep tendon reflexes were 2+ and symmetric [Alert and Oriented x3] : oriented to person, place, and time [de-identified] : 1" movable subcutaneous mass right side of upper posterior thorax

## 2022-04-21 LAB
ALBUMIN SERPL ELPH-MCNC: 4.9 G/DL
ALP BLD-CCNC: 90 U/L
ALT SERPL-CCNC: 20 U/L
ANION GAP SERPL CALC-SCNC: 13 MMOL/L
AST SERPL-CCNC: 15 U/L
BASOPHILS # BLD AUTO: 0.05 K/UL
BASOPHILS NFR BLD AUTO: 0.6 %
BILIRUB SERPL-MCNC: 0.6 MG/DL
BUN SERPL-MCNC: 16 MG/DL
CALCIUM SERPL-MCNC: 9.5 MG/DL
CHLORIDE SERPL-SCNC: 103 MMOL/L
CHOLEST SERPL-MCNC: 169 MG/DL
CO2 SERPL-SCNC: 24 MMOL/L
CREAT SERPL-MCNC: 0.87 MG/DL
EGFR: 101 ML/MIN/1.73M2
EOSINOPHIL # BLD AUTO: 0.04 K/UL
EOSINOPHIL NFR BLD AUTO: 0.4 %
ESTIMATED AVERAGE GLUCOSE: 117 MG/DL
GLUCOSE SERPL-MCNC: 109 MG/DL
HBA1C MFR BLD HPLC: 5.7 %
HCT VFR BLD CALC: 48.7 %
HDLC SERPL-MCNC: 35 MG/DL
HGB BLD-MCNC: 16.1 G/DL
IMM GRANULOCYTES NFR BLD AUTO: 0.2 %
LDLC SERPL CALC-MCNC: 92 MG/DL
LYMPHOCYTES # BLD AUTO: 1.82 K/UL
LYMPHOCYTES NFR BLD AUTO: 20.4 %
MAN DIFF?: NORMAL
MCHC RBC-ENTMCNC: 28.3 PG
MCHC RBC-ENTMCNC: 33.1 GM/DL
MCV RBC AUTO: 85.6 FL
MONOCYTES # BLD AUTO: 0.63 K/UL
MONOCYTES NFR BLD AUTO: 7.1 %
NEUTROPHILS # BLD AUTO: 6.37 K/UL
NEUTROPHILS NFR BLD AUTO: 71.3 %
NONHDLC SERPL-MCNC: 134 MG/DL
PLATELET # BLD AUTO: 263 K/UL
POTASSIUM SERPL-SCNC: 4.2 MMOL/L
PROT SERPL-MCNC: 7 G/DL
PSA SERPL-MCNC: 0.82 NG/ML
RBC # BLD: 5.69 M/UL
RBC # FLD: 12.7 %
SODIUM SERPL-SCNC: 141 MMOL/L
TRIGL SERPL-MCNC: 207 MG/DL
TSH SERPL-ACNC: 4.53 UIU/ML
WBC # FLD AUTO: 8.93 K/UL

## 2022-05-08 ENCOUNTER — RX RENEWAL (OUTPATIENT)
Age: 57
End: 2022-05-08

## 2022-05-16 ENCOUNTER — APPOINTMENT (OUTPATIENT)
Dept: CARDIOLOGY | Facility: CLINIC | Age: 57
End: 2022-05-16
Payer: COMMERCIAL

## 2022-05-16 VITALS
DIASTOLIC BLOOD PRESSURE: 72 MMHG | WEIGHT: 199 LBS | OXYGEN SATURATION: 98 % | HEART RATE: 79 BPM | SYSTOLIC BLOOD PRESSURE: 110 MMHG | BODY MASS INDEX: 32.12 KG/M2 | TEMPERATURE: 97.8 F

## 2022-05-16 DIAGNOSIS — Z82.49 FAMILY HISTORY OF ISCHEMIC HEART DISEASE AND OTHER DISEASES OF THE CIRCULATORY SYSTEM: ICD-10-CM

## 2022-05-16 DIAGNOSIS — R73.01 IMPAIRED FASTING GLUCOSE: ICD-10-CM

## 2022-05-16 DIAGNOSIS — R79.89 OTHER SPECIFIED ABNORMAL FINDINGS OF BLOOD CHEMISTRY: ICD-10-CM

## 2022-05-16 DIAGNOSIS — Z87.898 PERSONAL HISTORY OF OTHER SPECIFIED CONDITIONS: ICD-10-CM

## 2022-05-16 DIAGNOSIS — M21.41 FLAT FOOT [PES PLANUS] (ACQUIRED), RIGHT FOOT: ICD-10-CM

## 2022-05-16 DIAGNOSIS — Z11.59 ENCOUNTER FOR SCREENING FOR OTHER VIRAL DISEASES: ICD-10-CM

## 2022-05-16 DIAGNOSIS — M25.512 PAIN IN LEFT SHOULDER: ICD-10-CM

## 2022-05-16 DIAGNOSIS — M79.671 PAIN IN RIGHT FOOT: ICD-10-CM

## 2022-05-16 DIAGNOSIS — M21.42 FLAT FOOT [PES PLANUS] (ACQUIRED), RIGHT FOOT: ICD-10-CM

## 2022-05-16 DIAGNOSIS — Z87.438 PERSONAL HISTORY OF OTHER DISEASES OF MALE GENITAL ORGANS: ICD-10-CM

## 2022-05-16 DIAGNOSIS — Z86.39 PERSONAL HISTORY OF OTHER ENDOCRINE, NUTRITIONAL AND METABOLIC DISEASE: ICD-10-CM

## 2022-05-16 PROCEDURE — 99205 OFFICE O/P NEW HI 60 MIN: CPT

## 2022-05-19 ENCOUNTER — APPOINTMENT (OUTPATIENT)
Dept: CARDIOLOGY | Facility: CLINIC | Age: 57
End: 2022-05-19
Payer: COMMERCIAL

## 2022-05-19 PROCEDURE — 93306 TTE W/DOPPLER COMPLETE: CPT

## 2022-05-24 ENCOUNTER — APPOINTMENT (OUTPATIENT)
Dept: CARDIOLOGY | Facility: CLINIC | Age: 57
End: 2022-05-24
Payer: COMMERCIAL

## 2022-05-24 PROCEDURE — 93015 CV STRESS TEST SUPVJ I&R: CPT

## 2022-05-31 ENCOUNTER — APPOINTMENT (OUTPATIENT)
Dept: CARDIOLOGY | Facility: CLINIC | Age: 57
End: 2022-05-31
Payer: COMMERCIAL

## 2022-05-31 VITALS
HEART RATE: 95 BPM | DIASTOLIC BLOOD PRESSURE: 62 MMHG | BODY MASS INDEX: 31.64 KG/M2 | WEIGHT: 196 LBS | SYSTOLIC BLOOD PRESSURE: 90 MMHG | OXYGEN SATURATION: 98 % | TEMPERATURE: 97.8 F

## 2022-05-31 PROCEDURE — 99214 OFFICE O/P EST MOD 30 MIN: CPT

## 2022-07-09 ENCOUNTER — TELEPHONE ENCOUNTER (OUTPATIENT)
Dept: URBAN - METROPOLITAN AREA CLINIC 121 | Facility: CLINIC | Age: 57
End: 2022-07-09

## 2022-07-10 ENCOUNTER — TELEPHONE ENCOUNTER (OUTPATIENT)
Dept: URBAN - METROPOLITAN AREA CLINIC 121 | Facility: CLINIC | Age: 57
End: 2022-07-10

## 2022-07-18 ENCOUNTER — RX RENEWAL (OUTPATIENT)
Age: 57
End: 2022-07-18

## 2022-07-20 ENCOUNTER — APPOINTMENT (OUTPATIENT)
Dept: PLASTIC SURGERY | Facility: CLINIC | Age: 57
End: 2022-07-20

## 2022-07-20 VITALS
TEMPERATURE: 97.9 F | WEIGHT: 198 LBS | RESPIRATION RATE: 16 BRPM | BODY MASS INDEX: 31.82 KG/M2 | HEART RATE: 88 BPM | HEIGHT: 66 IN | SYSTOLIC BLOOD PRESSURE: 120 MMHG | OXYGEN SATURATION: 97 % | DIASTOLIC BLOOD PRESSURE: 80 MMHG

## 2022-07-20 DIAGNOSIS — Z82.3 FAMILY HISTORY OF STROKE: ICD-10-CM

## 2022-07-20 PROCEDURE — 99203 OFFICE O/P NEW LOW 30 MIN: CPT

## 2022-07-21 ENCOUNTER — RX RENEWAL (OUTPATIENT)
Age: 57
End: 2022-07-21

## 2022-07-23 PROBLEM — Z82.3 FAMILY HISTORY OF CEREBROVASCULAR ACCIDENT (CVA): Status: ACTIVE | Noted: 2022-07-20

## 2022-07-23 NOTE — ASSESSMENT
[FreeTextEntry1] : Right upper back mass, consistent with a lipoma.\par We discussed the risks, benefits and alternatives to excision of the mass, including bleeding, infection, scarring and recurrence of the lesion.\par This can be done in the office under local anesthesia. \par All of his questions were answered; he wishes to proceed.

## 2022-07-23 NOTE — HISTORY OF PRESENT ILLNESS
[FreeTextEntry1] : This is a 58 yo man who is referred by Phoebe Castellon for evaluation of an enlarging mass on his right upper back.\par He reports that it is not causing him a lot of trouble, but is concerned that it may be hard to remove if it gets too much larger.

## 2022-07-23 NOTE — REASON FOR VISIT
[Consultation] : a consultation visit [FreeTextEntry1] : Patient states he does not know how long he's had the lipoma on back.

## 2022-07-23 NOTE — PHYSICAL EXAM
[NI] : Normal [de-identified] : NL respiratory effort noted  [de-identified] : Right upper back subcutaneous mass, about 5 cm in diameter.\par No overlying punctum; no erythema or fluctuance.\par Nontender.

## 2022-10-03 ENCOUNTER — RX RENEWAL (OUTPATIENT)
Age: 57
End: 2022-10-03

## 2022-10-13 ENCOUNTER — APPOINTMENT (OUTPATIENT)
Dept: PLASTIC SURGERY | Facility: CLINIC | Age: 57
End: 2022-10-13

## 2022-10-13 VITALS
HEART RATE: 76 BPM | OXYGEN SATURATION: 98 % | BODY MASS INDEX: 32.14 KG/M2 | RESPIRATION RATE: 16 BRPM | TEMPERATURE: 98 F | SYSTOLIC BLOOD PRESSURE: 122 MMHG | WEIGHT: 200 LBS | HEIGHT: 66 IN | DIASTOLIC BLOOD PRESSURE: 76 MMHG

## 2022-10-13 PROCEDURE — 21552 EXC NECK LES SC 3 CM/>: CPT

## 2022-10-13 NOTE — PROCEDURE
[Nl] : None [FreeTextEntry1] : Right upper back mass [FreeTextEntry2] : Excision of right upper back mass, 5 cm [FreeTextEntry6] : The right upper back site was prepped with an alcohol swab and infiltrated with 1% lidocaine with epi. Time was allowed to pass for vasoconstriction.\par The area was re-prepped with CHG.\par A  6 cm incision was made with a 15 blade scalpel. This was carried through the dermis into the subcutaneous tissue. Blunt and sharp scissor dissection was used to expose the mass.\par It was multi-lobulated. All of the globules and extensions of the mass were removed. \par The wound was carefully examined for hemostasis.\par The closure was then begun. The subcutaneous tissue was closed using interrupted 2-0 Vicryl suture. The deep dermis was closed using interrupted 2-0 Vicryl sutures. The skin was then closed with a running intracuticular 3-0 Monocryl suture. \par The incision was then dressed with steri-strips and bordered gauze.\par Wound care instructions were given. May shower in two days (Saturday). Remove outer dressing before showering. Leave steri-strips in place.\par Follow up in one week.\par Call for any questions or concerns.\par   [FreeTextEntry7] : Right upper back lipoma

## 2022-10-19 ENCOUNTER — NON-APPOINTMENT (OUTPATIENT)
Age: 57
End: 2022-10-19

## 2022-10-19 ENCOUNTER — APPOINTMENT (OUTPATIENT)
Dept: PLASTIC SURGERY | Facility: CLINIC | Age: 57
End: 2022-10-19

## 2022-10-19 VITALS
RESPIRATION RATE: 16 BRPM | DIASTOLIC BLOOD PRESSURE: 78 MMHG | HEART RATE: 92 BPM | BODY MASS INDEX: 31.98 KG/M2 | HEIGHT: 66 IN | OXYGEN SATURATION: 98 % | SYSTOLIC BLOOD PRESSURE: 130 MMHG | TEMPERATURE: 98.1 F | WEIGHT: 199 LBS

## 2022-10-19 LAB — CORE LAB BIOPSY: NORMAL

## 2022-10-19 PROCEDURE — 99024 POSTOP FOLLOW-UP VISIT: CPT

## 2022-10-19 NOTE — PHYSICAL EXAM
[NI] : Normal [de-identified] : NL respiratory effort noted  [de-identified] : Right upper back incision with steristrips in place.\par These were removed. The incision is clean and healing well.\par No erythema. No collection.

## 2022-10-19 NOTE — HISTORY OF PRESENT ILLNESS
[FreeTextEntry1] : Pt reports he did have some soreness, but he didn't really have any problems.\par He did notice some minor itching.

## 2022-10-19 NOTE — REASON FOR VISIT
[Follow-Up: _____] : a [unfilled] follow-up visit [FreeTextEntry1] : Patient states everything is going well no complaints.

## 2022-10-19 NOTE — ASSESSMENT
[FreeTextEntry1] : Doing well s/p lipoma excision.\par I received the path report just after the pt left, so I called him back and informed him of the results (c/w lipoma).\par Follow up as needed.\par Scar care briefly discussed with pt, but he is not interested in using silicone gel.\par Ok to leave open to air, wash as usual and apply standard skin lotion to avoid itching.

## 2022-10-22 ENCOUNTER — RX RENEWAL (OUTPATIENT)
Age: 57
End: 2022-10-22

## 2022-10-24 ENCOUNTER — RX RENEWAL (OUTPATIENT)
Age: 57
End: 2022-10-24

## 2022-10-26 ENCOUNTER — APPOINTMENT (OUTPATIENT)
Dept: FAMILY MEDICINE | Facility: CLINIC | Age: 57
End: 2022-10-26

## 2022-10-26 VITALS
DIASTOLIC BLOOD PRESSURE: 78 MMHG | HEIGHT: 66 IN | SYSTOLIC BLOOD PRESSURE: 116 MMHG | WEIGHT: 199 LBS | OXYGEN SATURATION: 98 % | RESPIRATION RATE: 16 BRPM | HEART RATE: 68 BPM | BODY MASS INDEX: 31.98 KG/M2

## 2022-10-26 PROCEDURE — 99214 OFFICE O/P EST MOD 30 MIN: CPT | Mod: 25

## 2022-10-26 PROCEDURE — 36415 COLL VENOUS BLD VENIPUNCTURE: CPT

## 2022-10-26 RX ORDER — AMOXICILLIN 500 MG/1
500 CAPSULE ORAL
Qty: 21 | Refills: 0 | Status: COMPLETED | COMMUNITY
Start: 2022-08-24

## 2022-10-26 NOTE — HISTORY OF PRESENT ILLNESS
[FreeTextEntry1] : Jonny is here for a medication refill today\par Olmesartan, omeprazole, simvastatin \par [facility]  [de-identified] : Jonny has f/o ED, HTN and hyperlipidemia. He states resection of lipoma left upper back went well. He denies complaint\par Flu vaccine declined

## 2022-10-27 LAB
ALBUMIN SERPL ELPH-MCNC: 4.4 G/DL
ALP BLD-CCNC: 85 U/L
ALT SERPL-CCNC: 26 U/L
ANION GAP SERPL CALC-SCNC: 11 MMOL/L
AST SERPL-CCNC: 21 U/L
BILIRUB SERPL-MCNC: 0.5 MG/DL
BUN SERPL-MCNC: 14 MG/DL
CALCIUM SERPL-MCNC: 9.2 MG/DL
CHLORIDE SERPL-SCNC: 104 MMOL/L
CHOLEST SERPL-MCNC: 180 MG/DL
CO2 SERPL-SCNC: 27 MMOL/L
CREAT SERPL-MCNC: 0.8 MG/DL
EGFR: 104 ML/MIN/1.73M2
ESTIMATED AVERAGE GLUCOSE: 114 MG/DL
GLUCOSE SERPL-MCNC: 88 MG/DL
HBA1C MFR BLD HPLC: 5.6 %
HDLC SERPL-MCNC: 36 MG/DL
LDLC SERPL CALC-MCNC: 103 MG/DL
NONHDLC SERPL-MCNC: 144 MG/DL
POTASSIUM SERPL-SCNC: 4.4 MMOL/L
PROT SERPL-MCNC: 6.9 G/DL
SODIUM SERPL-SCNC: 142 MMOL/L
T4 FREE SERPL-MCNC: 1.3 NG/DL
TRIGL SERPL-MCNC: 208 MG/DL
TSH SERPL-ACNC: 1.97 UIU/ML

## 2022-11-30 ENCOUNTER — APPOINTMENT (OUTPATIENT)
Dept: CARDIOLOGY | Facility: CLINIC | Age: 57
End: 2022-11-30

## 2022-11-30 VITALS
HEART RATE: 90 BPM | OXYGEN SATURATION: 95 % | TEMPERATURE: 97.3 F | DIASTOLIC BLOOD PRESSURE: 70 MMHG | SYSTOLIC BLOOD PRESSURE: 100 MMHG

## 2022-11-30 VITALS — WEIGHT: 199 LBS | BODY MASS INDEX: 32.12 KG/M2 | TEMPERATURE: 97.8 F

## 2022-11-30 DIAGNOSIS — Z23 ENCOUNTER FOR IMMUNIZATION: ICD-10-CM

## 2022-11-30 DIAGNOSIS — R73.02 IMPAIRED GLUCOSE TOLERANCE (ORAL): ICD-10-CM

## 2022-11-30 DIAGNOSIS — Z87.19 PERSONAL HISTORY OF OTHER DISEASES OF THE DIGESTIVE SYSTEM: ICD-10-CM

## 2022-11-30 DIAGNOSIS — R07.89 OTHER CHEST PAIN: ICD-10-CM

## 2022-11-30 DIAGNOSIS — Z09 ENCOUNTER FOR FOLLOW-UP EXAMINATION AFTER COMPLETED TREATMENT FOR CONDITIONS OTHER THAN MALIGNANT NEOPLASM: ICD-10-CM

## 2022-11-30 DIAGNOSIS — Z87.442 PERSONAL HISTORY OF URINARY CALCULI: ICD-10-CM

## 2022-11-30 DIAGNOSIS — Z86.018 PERSONAL HISTORY OF OTHER BENIGN NEOPLASM: ICD-10-CM

## 2022-11-30 PROCEDURE — 93880 EXTRACRANIAL BILAT STUDY: CPT

## 2022-11-30 PROCEDURE — 99214 OFFICE O/P EST MOD 30 MIN: CPT

## 2023-01-17 ENCOUNTER — RX RENEWAL (OUTPATIENT)
Age: 58
End: 2023-01-17

## 2023-01-19 ENCOUNTER — RX RENEWAL (OUTPATIENT)
Age: 58
End: 2023-01-19

## 2023-01-23 ENCOUNTER — RX RENEWAL (OUTPATIENT)
Age: 58
End: 2023-01-23

## 2023-02-07 NOTE — ASSESSMENT
called Dr. Dobbs's office to request a follow up appointment.  was able to schedule with the provider. The provider is an addiction specialist who can do MAT medication but can not prescribe psychiatric medication. Patient signed another ED for a medication prescriber so  can set up another provider if needed.  updated MD and asked MD to let her know if she plans to start the patient on psychiatric medication. MD said she will update gener.     Aysha Kilgore LPC   2/7/2023     [FreeTextEntry1] : Plan\par restart flomax\par fu 1 month

## 2023-04-17 ENCOUNTER — RX RENEWAL (OUTPATIENT)
Age: 58
End: 2023-04-17

## 2023-05-23 ENCOUNTER — APPOINTMENT (OUTPATIENT)
Dept: CARDIOLOGY | Facility: CLINIC | Age: 58
End: 2023-05-23

## 2023-05-26 ENCOUNTER — APPOINTMENT (OUTPATIENT)
Dept: FAMILY MEDICINE | Facility: CLINIC | Age: 58
End: 2023-05-26
Payer: COMMERCIAL

## 2023-05-26 VITALS
TEMPERATURE: 97.9 F | DIASTOLIC BLOOD PRESSURE: 76 MMHG | WEIGHT: 199.8 LBS | SYSTOLIC BLOOD PRESSURE: 110 MMHG | OXYGEN SATURATION: 98 % | RESPIRATION RATE: 16 BRPM | HEIGHT: 66 IN | BODY MASS INDEX: 32.11 KG/M2 | HEART RATE: 87 BPM

## 2023-05-26 DIAGNOSIS — E66.9 OBESITY, UNSPECIFIED: ICD-10-CM

## 2023-05-26 PROCEDURE — 99214 OFFICE O/P EST MOD 30 MIN: CPT | Mod: 25

## 2023-05-26 PROCEDURE — 36415 COLL VENOUS BLD VENIPUNCTURE: CPT

## 2023-05-26 NOTE — PHYSICAL EXAM
[No Acute Distress] : no acute distress [Well-Appearing] : well-appearing [Normal Sclera/Conjunctiva] : normal sclera/conjunctiva [No JVD] : no jugular venous distention [No Respiratory Distress] : no respiratory distress  [No Accessory Muscle Use] : no accessory muscle use [Clear to Auscultation] : lungs were clear to auscultation bilaterally [Regular Rhythm] : with a regular rhythm [Normal S1, S2] : normal S1 and S2 [No Edema] : there was no peripheral edema [Soft] : abdomen soft [Non Tender] : non-tender [No Spinal Tenderness] : no spinal tenderness [No Joint Swelling] : no joint swelling [No Focal Deficits] : no focal deficits [Speech Grossly Normal] : speech grossly normal [Alert and Oriented x3] : oriented to person, place, and time [de-identified] : calm and engaging  [de-identified] : CURT

## 2023-05-26 NOTE — REVIEW OF SYSTEMS
[Negative] : Musculoskeletal [Fever] : no fever [Vision Problems] : no vision problems [Nasal Discharge] : no nasal discharge [Hoarseness] : no hoarseness [Chest Pain] : no chest pain [Palpitations] : no palpitations [Wheezing] : no wheezing [Dyspnea on Exertion] : not dyspnea on exertion [Dizziness] : no dizziness [Unsteady Walk] : no ataxia [Anxiety] : no anxiety [Depression] : no depression [FreeTextEntry7] : intermittent GERD  takes omeprazole PRN

## 2023-05-26 NOTE — HISTORY OF PRESENT ILLNESS
[FreeTextEntry1] : Follow up and refills on Levothyroxine, Olmesartan, [facility]atin[facility] [place]  Sildenafil to [facility] in [place].  Fasting labs. [de-identified] : Last seen in October 2022 and encounter reviewed.\par \par Since  endorses being well  and denies any RECENT ER/UC visits, hospitalizations , MVA's or NEW MSK injuries/complaints. \par Complaint with medications. \par \par Laments weight!\par Does admit to light lunch but usual dinner and snacks all evening  [employer]  or [employer] candy "anything sweet".

## 2023-05-26 NOTE — HEALTH RISK ASSESSMENT
[Yes] : Yes [2 - 3 times a week (3 pts)] : 2 - 3  times a week (3 points) [1 or 2 (0 pts)] : 1 or 2 (0 points) [Never (0 pts)] : Never (0 points) [No] : In the past 12 months have you used drugs other than those required for medical reasons? No [No falls in past year] : Patient reported no falls in the past year [0] : 2) Feeling down, depressed, or hopeless: Not at all (0) [PHQ-2 Negative - No further assessment needed] : PHQ-2 Negative - No further assessment needed [Former] : Former [> 15 Years] : > 15 Years [Audit-CScore] : 3 [[id number]] : 0

## 2023-05-26 NOTE — ASSESSMENT
[FreeTextEntry1] : 58 year old WM  being seen in [facility] on   HTN/Hypothyroidism/HLD and Obesity : \par \par #1:  HTN  Olmesartan refill I;  increase activity  and KUNAL  \par \par #2  Hypothyroidism  Refill Levothyroxine after lab review \par \par  #3 Refill simvastatin after lab review.\par \par # 4  obesity   encouraged him to reduce snack in PM and to avoid daily intake;  Fruit suggested as alternative. \par \par

## 2023-06-01 LAB
ALBUMIN SERPL ELPH-MCNC: 4.6 G/DL
ALP BLD-CCNC: 86 U/L
ALT SERPL-CCNC: 23 U/L
ANION GAP SERPL CALC-SCNC: 13 MMOL/L
AST SERPL-CCNC: 20 U/L
BILIRUB SERPL-MCNC: 0.6 MG/DL
BUN SERPL-MCNC: 15 MG/DL
CALCIUM SERPL-MCNC: 9.3 MG/DL
CHLORIDE SERPL-SCNC: 104 MMOL/L
CHOLEST SERPL-MCNC: 171 MG/DL
CO2 SERPL-SCNC: 26 MMOL/L
CREAT SERPL-MCNC: 0.85 MG/DL
EGFR: 101 ML/MIN/1.73M2
ESTIMATED AVERAGE GLUCOSE: 117 MG/DL
GLUCOSE SERPL-MCNC: 98 MG/DL
HBA1C MFR BLD HPLC: 5.7 %
HDLC SERPL-MCNC: 36 MG/DL
LDLC SERPL CALC-MCNC: 92 MG/DL
NONHDLC SERPL-MCNC: 135 MG/DL
POTASSIUM SERPL-SCNC: 4.3 MMOL/L
PROT SERPL-MCNC: 6.9 G/DL
SODIUM SERPL-SCNC: 142 MMOL/L
T4 SERPL-MCNC: 8.4 UG/DL
TRIGL SERPL-MCNC: 216 MG/DL
TSH SERPL-ACNC: 2.38 UIU/ML

## 2023-06-09 NOTE — HISTORY OF PRESENT ILLNESS
[FreeTextEntry1] : refill simvastatin and levothyroxine \par fbw\par  [de-identified] : The pt presents for FBW and states he feels well. He needs refill of chol and thyroid med.  [facility] Pregnancy And Lactation Text: This medication is Pregnancy Category C and it isn't known if it is safe during pregnancy. It is unknown if it is excreted in breast milk.

## 2023-10-30 ENCOUNTER — RX RENEWAL (OUTPATIENT)
Age: 58
End: 2023-10-30

## 2023-12-01 ENCOUNTER — APPOINTMENT (OUTPATIENT)
Dept: FAMILY MEDICINE | Facility: CLINIC | Age: 58
End: 2023-12-01
Payer: COMMERCIAL

## 2023-12-01 VITALS
OXYGEN SATURATION: 98 % | WEIGHT: 195 LBS | DIASTOLIC BLOOD PRESSURE: 80 MMHG | BODY MASS INDEX: 31.34 KG/M2 | RESPIRATION RATE: 16 BRPM | HEART RATE: 98 BPM | TEMPERATURE: 98 F | SYSTOLIC BLOOD PRESSURE: 110 MMHG | HEIGHT: 66 IN

## 2023-12-01 DIAGNOSIS — E03.9 HYPOTHYROIDISM, UNSPECIFIED: ICD-10-CM

## 2023-12-01 DIAGNOSIS — E78.5 HYPERLIPIDEMIA, UNSPECIFIED: ICD-10-CM

## 2023-12-01 DIAGNOSIS — I10 ESSENTIAL (PRIMARY) HYPERTENSION: ICD-10-CM

## 2023-12-01 PROCEDURE — 36415 COLL VENOUS BLD VENIPUNCTURE: CPT

## 2023-12-01 PROCEDURE — 99214 OFFICE O/P EST MOD 30 MIN: CPT | Mod: 25

## 2023-12-01 RX ORDER — OLMESARTAN MEDOXOMIL 20 MG/1
20 TABLET, FILM COATED ORAL
Qty: 90 | Refills: 1 | Status: ACTIVE | COMMUNITY
Start: 2019-08-27 | End: 1900-01-01

## 2023-12-01 RX ORDER — SILDENAFIL 20 MG/1
20 TABLET ORAL
Qty: 10 | Refills: 3 | Status: ACTIVE | COMMUNITY
Start: 2021-01-22 | End: 1900-01-01

## 2023-12-01 RX ORDER — OMEPRAZOLE 20 MG/1
20 CAPSULE, DELAYED RELEASE ORAL
Qty: 90 | Refills: 0 | Status: DISCONTINUED | COMMUNITY
Start: 2022-04-20 | End: 2023-12-01

## 2023-12-07 LAB
ALBUMIN SERPL ELPH-MCNC: 4.8 G/DL
ALP BLD-CCNC: 86 U/L
ALT SERPL-CCNC: 20 U/L
ANION GAP SERPL CALC-SCNC: 14 MMOL/L
AST SERPL-CCNC: 18 U/L
BASOPHILS # BLD AUTO: 0.04 K/UL
BASOPHILS NFR BLD AUTO: 0.5 %
BILIRUB SERPL-MCNC: 0.4 MG/DL
BUN SERPL-MCNC: 16 MG/DL
CALCIUM SERPL-MCNC: 9 MG/DL
CHLORIDE SERPL-SCNC: 102 MMOL/L
CHOLEST SERPL-MCNC: 173 MG/DL
CO2 SERPL-SCNC: 26 MMOL/L
CREAT SERPL-MCNC: 0.86 MG/DL
EGFR: 101 ML/MIN/1.73M2
EOSINOPHIL # BLD AUTO: 0.13 K/UL
EOSINOPHIL NFR BLD AUTO: 1.6 %
ESTIMATED AVERAGE GLUCOSE: 111 MG/DL
GLUCOSE SERPL-MCNC: 93 MG/DL
HBA1C MFR BLD HPLC: 5.5 %
HCT VFR BLD CALC: 47.4 %
HDLC SERPL-MCNC: 35 MG/DL
HGB BLD-MCNC: 16 G/DL
IMM GRANULOCYTES NFR BLD AUTO: 0.4 %
LDLC SERPL CALC-MCNC: 111 MG/DL
LYMPHOCYTES # BLD AUTO: 2.11 K/UL
LYMPHOCYTES NFR BLD AUTO: 25.8 %
MAN DIFF?: NORMAL
MCHC RBC-ENTMCNC: 29.6 PG
MCHC RBC-ENTMCNC: 33.8 GM/DL
MCV RBC AUTO: 87.6 FL
MONOCYTES # BLD AUTO: 0.76 K/UL
MONOCYTES NFR BLD AUTO: 9.3 %
NEUTROPHILS # BLD AUTO: 5.1 K/UL
NEUTROPHILS NFR BLD AUTO: 62.4 %
NONHDLC SERPL-MCNC: 138 MG/DL
PLATELET # BLD AUTO: 253 K/UL
POTASSIUM SERPL-SCNC: 4.3 MMOL/L
PROT SERPL-MCNC: 6.9 G/DL
RBC # BLD: 5.41 M/UL
RBC # FLD: 12.8 %
SODIUM SERPL-SCNC: 142 MMOL/L
TRIGL SERPL-MCNC: 151 MG/DL
WBC # FLD AUTO: 8.17 K/UL

## 2023-12-30 NOTE — HISTORY OF PRESENT ILLNESS
[FreeTextEntry1] : Patient in office for Fasting Bloodwork and Medication Renewal last seen 5/26/2023. No concerns or complaints for today's visit [de-identified] :  In review May 2023:  Last seen in October 2022 and encounter reviewed.  Since  endorses being well  and denies any RECENT ER/UC visits, hospitalizations , MVA's or NEW MSK injuries/complaints.  Complaint with medications.   Laments weight! Does admit to light lunch but usual dinner and snacks all evening  [employer]  or [employer] candy "anything sweet".

## 2023-12-30 NOTE — REVIEW OF SYSTEMS
[Fever] : no fever [Vision Problems] : no vision problems [Nasal Discharge] : no nasal discharge [Hoarseness] : no hoarseness [Chest Pain] : no chest pain [Palpitations] : no palpitations [Wheezing] : no wheezing [Dyspnea on Exertion] : not dyspnea on exertion [Dizziness] : no dizziness [Unsteady Walk] : no ataxia [Anxiety] : no anxiety [Depression] : no depression [FreeTextEntry7] : intermittent GERD  takes omeprazole PRN

## 2024-04-25 ENCOUNTER — RX RENEWAL (OUTPATIENT)
Age: 59
End: 2024-04-25

## 2024-04-25 RX ORDER — TAMSULOSIN HYDROCHLORIDE 0.4 MG/1
0.4 CAPSULE ORAL
Qty: 90 | Refills: 1 | Status: ACTIVE | COMMUNITY
Start: 2019-12-13 | End: 1900-01-01

## 2024-05-24 ENCOUNTER — APPOINTMENT (OUTPATIENT)
Dept: UROLOGY | Facility: CLINIC | Age: 59
End: 2024-05-24
Payer: COMMERCIAL

## 2024-05-24 VITALS
WEIGHT: 198 LBS | SYSTOLIC BLOOD PRESSURE: 104 MMHG | HEART RATE: 94 BPM | BODY MASS INDEX: 31.82 KG/M2 | OXYGEN SATURATION: 98 % | HEIGHT: 66 IN | DIASTOLIC BLOOD PRESSURE: 67 MMHG

## 2024-05-24 LAB
BILIRUB UR QL STRIP: NEGATIVE
CLARITY UR: CLEAR
COLLECTION METHOD: NORMAL
GLUCOSE UR-MCNC: NEGATIVE
HCG UR QL: 0.2 EU/DL
HGB UR QL STRIP.AUTO: NORMAL
KETONES UR-MCNC: NEGATIVE
LEUKOCYTE ESTERASE UR QL STRIP: NEGATIVE
NITRITE UR QL STRIP: NEGATIVE
PH UR STRIP: 7
PROT UR STRIP-MCNC: NEGATIVE
SP GR UR STRIP: 1.01

## 2024-05-24 PROCEDURE — 99204 OFFICE O/P NEW MOD 45 MIN: CPT | Mod: 25

## 2024-05-24 PROCEDURE — 81003 URINALYSIS AUTO W/O SCOPE: CPT | Mod: QW

## 2024-05-24 NOTE — HISTORY OF PRESENT ILLNESS
[FreeTextEntry1] : Pt comes in follow LUTS and is on flomax and is happy with his urination. 2 Monday ago went to [facility] and was told he may had kidney. Pt currently having decreased flow and right flank pain. Pt also having some urgency.    2/2019 PSA 0.41 7/2/20 PSA 1.26

## 2024-05-25 ENCOUNTER — RX RENEWAL (OUTPATIENT)
Age: 59
End: 2024-05-25

## 2024-05-25 RX ORDER — LEVOTHYROXINE SODIUM 0.05 MG/1
50 TABLET ORAL
Qty: 90 | Refills: 1 | Status: ACTIVE | COMMUNITY
Start: 2018-05-29 | End: 1900-01-01

## 2024-05-25 RX ORDER — SIMVASTATIN 20 MG/1
20 TABLET, FILM COATED ORAL
Qty: 90 | Refills: 1 | Status: ACTIVE | COMMUNITY
Start: 2020-09-23 | End: 1900-01-01

## 2024-05-29 LAB
APPEARANCE: CLEAR
BACTERIA UR CULT: NORMAL
BACTERIA: NEGATIVE /HPF
BILIRUBIN URINE: NEGATIVE
BLOOD URINE: NEGATIVE
CAST: 0 /LPF
COLOR: YELLOW
EPITHELIAL CELLS: 0 /HPF
GLUCOSE QUALITATIVE U: NEGATIVE MG/DL
KETONES URINE: NEGATIVE MG/DL
LEUKOCYTE ESTERASE URINE: NEGATIVE
MICROSCOPIC-UA: NORMAL
NITRITE URINE: NEGATIVE
PH URINE: 7
PROTEIN URINE: NEGATIVE MG/DL
RED BLOOD CELLS URINE: 0 /HPF
SPECIFIC GRAVITY URINE: 1.01
UROBILINOGEN URINE: 0.2 MG/DL
WHITE BLOOD CELLS URINE: 0 /HPF

## 2024-05-30 ENCOUNTER — APPOINTMENT (OUTPATIENT)
Dept: ULTRASOUND IMAGING | Facility: CLINIC | Age: 59
End: 2024-05-30
Payer: COMMERCIAL

## 2024-05-30 PROCEDURE — 76770 US EXAM ABDO BACK WALL COMP: CPT

## 2024-06-06 ENCOUNTER — APPOINTMENT (OUTPATIENT)
Dept: UROLOGY | Facility: CLINIC | Age: 59
End: 2024-06-06
Payer: COMMERCIAL

## 2024-06-06 VITALS
SYSTOLIC BLOOD PRESSURE: 122 MMHG | BODY MASS INDEX: 31.82 KG/M2 | HEART RATE: 101 BPM | WEIGHT: 198 LBS | DIASTOLIC BLOOD PRESSURE: 74 MMHG | HEIGHT: 66 IN | TEMPERATURE: 97.3 F

## 2024-06-06 DIAGNOSIS — N52.9 MALE ERECTILE DYSFUNCTION, UNSPECIFIED: ICD-10-CM

## 2024-06-06 DIAGNOSIS — N13.8 BENIGN PROSTATIC HYPERPLASIA WITH LOWER URINARY TRACT SYMPMS: ICD-10-CM

## 2024-06-06 DIAGNOSIS — N40.1 BENIGN PROSTATIC HYPERPLASIA WITH LOWER URINARY TRACT SYMPMS: ICD-10-CM

## 2024-06-06 PROCEDURE — 99214 OFFICE O/P EST MOD 30 MIN: CPT

## 2024-06-06 NOTE — HISTORY OF PRESENT ILLNESS
[FreeTextEntry1] : Pt comes in follow LUTS and is on flomax and is happy with his urination. 2 Monday ago went to [facility] and was told he may had kidney. Pt currently having decreased flow and right flank pain. Pt also having some urgency.    2/2019 PSA 0.41 7/2/20 PSA 1.26 5/10/24 PSA 0.39  5/30/24 US Right kidney: 10.9 cm. No renal mass, hydronephrosis or calculi.  Left kidney: 12.2 cm. No renal mass, hydronephrosis or calculi.  Urinary bladder: Within normal limits. No mass, stone or wall thickening. Prevoid bladder volume is 160 mL and post void bladder volume is 19 mL.  Prostate: Mildly enlarged, measuring 3.6 x 3.6 x 5.2 cm for a volume of 36 mL (normal less than 25 mL).  IMPRESSION: Unremarkable kidneys. Small post void residual bladder volume. Mild prostatomegaly.

## 2024-06-07 ENCOUNTER — APPOINTMENT (OUTPATIENT)
Dept: UROLOGY | Facility: CLINIC | Age: 59
End: 2024-06-07
Payer: COMMERCIAL

## 2024-06-07 VITALS
BODY MASS INDEX: 31.82 KG/M2 | WEIGHT: 198 LBS | HEART RATE: 116 BPM | DIASTOLIC BLOOD PRESSURE: 80 MMHG | TEMPERATURE: 98.1 F | SYSTOLIC BLOOD PRESSURE: 107 MMHG | HEIGHT: 66 IN

## 2024-06-07 DIAGNOSIS — R30.0 DYSURIA: ICD-10-CM

## 2024-06-07 DIAGNOSIS — N41.0 ACUTE PROSTATITIS: ICD-10-CM

## 2024-06-07 LAB
BILIRUB UR QL STRIP: NEGATIVE
CLARITY UR: CLEAR
COLLECTION METHOD: NORMAL
GLUCOSE UR-MCNC: NEGATIVE
HCG UR QL: 0.2 EU/DL
HGB UR QL STRIP.AUTO: NORMAL
KETONES UR-MCNC: NEGATIVE
LEUKOCYTE ESTERASE UR QL STRIP: NEGATIVE
NITRITE UR QL STRIP: NEGATIVE
PH UR STRIP: 6.5
PROT UR STRIP-MCNC: NEGATIVE
SP GR UR STRIP: 1

## 2024-06-07 PROCEDURE — 99214 OFFICE O/P EST MOD 30 MIN: CPT | Mod: 25

## 2024-06-07 PROCEDURE — 81003 URINALYSIS AUTO W/O SCOPE: CPT | Mod: QW

## 2024-06-07 RX ORDER — LEVOFLOXACIN 500 MG/1
500 TABLET, FILM COATED ORAL DAILY
Qty: 14 | Refills: 0 | Status: ACTIVE | COMMUNITY
Start: 2024-06-07 | End: 1900-01-01

## 2024-06-07 NOTE — HISTORY OF PRESENT ILLNESS
[FreeTextEntry1] : Pt comes in bc of the feeling of incomplete emptying. Pt urinating every 15-20 minutes. Pt has had kidney stones in the past. Pt having pain at the tip of the penis.   PVR 67

## 2024-06-10 LAB
APPEARANCE: CLEAR
BACTERIA UR CULT: NORMAL
BACTERIA: NEGATIVE /HPF
BILIRUBIN URINE: NEGATIVE
BLOOD URINE: ABNORMAL
CAST: 0 /LPF
COLOR: YELLOW
EPITHELIAL CELLS: 0 /HPF
GLUCOSE QUALITATIVE U: NEGATIVE MG/DL
KETONES URINE: NEGATIVE MG/DL
LEUKOCYTE ESTERASE URINE: NEGATIVE
MICROSCOPIC-UA: NORMAL
NITRITE URINE: NEGATIVE
PH URINE: 7
PROTEIN URINE: NEGATIVE MG/DL
RED BLOOD CELLS URINE: 0 /HPF
SPECIFIC GRAVITY URINE: <1.005
UROBILINOGEN URINE: 0.2 MG/DL
WHITE BLOOD CELLS URINE: 0 /HPF

## 2024-06-19 ENCOUNTER — APPOINTMENT (OUTPATIENT)
Dept: CT IMAGING | Facility: CLINIC | Age: 59
End: 2024-06-19
Payer: COMMERCIAL

## 2024-06-19 PROCEDURE — 74176 CT ABD & PELVIS W/O CONTRAST: CPT

## 2024-06-25 ENCOUNTER — APPOINTMENT (OUTPATIENT)
Dept: UROLOGY | Facility: CLINIC | Age: 59
End: 2024-06-25
Payer: COMMERCIAL

## 2024-06-25 VITALS
HEART RATE: 85 BPM | SYSTOLIC BLOOD PRESSURE: 128 MMHG | BODY MASS INDEX: 31.82 KG/M2 | WEIGHT: 198 LBS | HEIGHT: 66 IN | DIASTOLIC BLOOD PRESSURE: 76 MMHG | TEMPERATURE: 97.3 F

## 2024-06-25 DIAGNOSIS — N20.0 CALCULUS OF KIDNEY: ICD-10-CM

## 2024-06-25 DIAGNOSIS — R10.9 UNSPECIFIED ABDOMINAL PAIN: ICD-10-CM

## 2024-06-25 PROCEDURE — 99213 OFFICE O/P EST LOW 20 MIN: CPT

## 2024-07-01 LAB
KIDNEY STONE SOURCE: NORMAL
NIDUS STONE QN: NORMAL
RESULT COMMENT: NORMAL

## 2024-08-08 ENCOUNTER — APPOINTMENT (OUTPATIENT)
Dept: UROLOGY | Facility: CLINIC | Age: 59
End: 2024-08-08

## 2024-08-08 PROCEDURE — 99213 OFFICE O/P EST LOW 20 MIN: CPT

## 2024-08-08 NOTE — HISTORY OF PRESENT ILLNESS
[FreeTextEntry1] : Pt comes in bc of the feeling of incomplete emptying. Pt urinating every 15-20 minutes. Pt has had kidney stones in the past. Pt having pain at the tip of the penis.   Pt passed stone on Sunday and is now symptom free. CT scan was done after passing stone CT shows a 2mm left lower pole stone.

## 2024-09-06 ENCOUNTER — APPOINTMENT (OUTPATIENT)
Dept: UROLOGY | Facility: CLINIC | Age: 59
End: 2024-09-06
Payer: COMMERCIAL

## 2024-09-06 VITALS
TEMPERATURE: 97.8 F | HEART RATE: 88 BPM | BODY MASS INDEX: 31.82 KG/M2 | HEIGHT: 66 IN | DIASTOLIC BLOOD PRESSURE: 65 MMHG | SYSTOLIC BLOOD PRESSURE: 105 MMHG | WEIGHT: 198 LBS

## 2024-09-06 DIAGNOSIS — N20.0 CALCULUS OF KIDNEY: ICD-10-CM

## 2024-09-06 PROCEDURE — 99214 OFFICE O/P EST MOD 30 MIN: CPT

## 2024-09-06 NOTE — HISTORY OF PRESENT ILLNESS
[FreeTextEntry1] : Pt comes in bc of the feeling of incomplete emptying. Pt urinating every 15-20 minutes. Pt has had kidney stones in the past. Pt having pain at the tip of the penis.   Pt passed stone on Sunday and is now symptom free. CT scan was done after passing stone CT shows a 2mm left lower pole stone.   8/15/24 [facility] reviewed in detail with pt. Pt still drinks a considerable amount of milk

## 2024-09-06 NOTE — ASSESSMENT
[FreeTextEntry1] : Plan decrease milk intake, as well as overall calcium, vit D, and sodium intake repeat [facility]olink 6 weeks

## 2024-10-21 ENCOUNTER — APPOINTMENT (OUTPATIENT)
Dept: PLASTIC SURGERY | Facility: CLINIC | Age: 59
End: 2024-10-21
Payer: COMMERCIAL

## 2024-10-21 VITALS
OXYGEN SATURATION: 96 % | DIASTOLIC BLOOD PRESSURE: 70 MMHG | HEIGHT: 66 IN | BODY MASS INDEX: 31.34 KG/M2 | WEIGHT: 195 LBS | HEART RATE: 92 BPM | TEMPERATURE: 98.1 F | SYSTOLIC BLOOD PRESSURE: 108 MMHG

## 2024-10-21 DIAGNOSIS — L72.0 EPIDERMAL CYST: ICD-10-CM

## 2024-10-21 PROCEDURE — 99213 OFFICE O/P EST LOW 20 MIN: CPT

## 2024-10-21 NOTE — PHYSICAL EXAM
[NI] : Normal [de-identified] : Right upper neck: 1.5 cm oval, firm cyst with an area tethered to the skin which is likely a punctum.  Mobile, no pus, oozing, or erythema.

## 2024-10-21 NOTE — ADDENDUM
[FreeTextEntry1] : All medical record entries were at my direction and personally dictated by me (Dr. Kylee Chavira). I have reviewed the chart and agree that the record accurately reflects my personal performance of the history, physical exam, assessment and plan.    We discussed direct excision which, in my opinion, is the best option. The R/B/A were reviewed including, but not limited to, bleeding, bruising, infection, scarring and poor wound healing as well as recurrence.   He was given an opportunity to ask questions and all of his questions were answered. He wishes to proceed.

## 2024-10-21 NOTE — HISTORY OF PRESENT ILLNESS
[FreeTextEntry1] : Patient presents for evaluation of what he thinks is another lipoma on the right side of his neck.  He states that he is so happy with the scar that was left after Dr Chavira removed a large lipoma on his back and would not go to anyone else for this procedure.  He states the neck mass is not bothering him, it is just that his wife told him to come in. No other complaints.

## 2024-10-21 NOTE — ASSESSMENT
[FreeTextEntry1] : 58 y/o male for visit for inclusion cyst of neck  Plan is for excision under local anesthesia in the office. All questions and concerns addressed. Plan and patient status as per Dr Chavira.

## 2024-10-21 NOTE — REASON FOR VISIT
[Follow-Up: _____] : a [unfilled] follow-up visit [FreeTextEntry1] : Patient states all is well. He's just her to discuss a possible lipoma on the right side of his neck.

## 2024-11-18 ENCOUNTER — RX RENEWAL (OUTPATIENT)
Age: 59
End: 2024-11-18

## 2024-12-16 ENCOUNTER — APPOINTMENT (OUTPATIENT)
Dept: PLASTIC SURGERY | Facility: CLINIC | Age: 59
End: 2024-12-16
Payer: COMMERCIAL

## 2024-12-16 VITALS
TEMPERATURE: 97.3 F | BODY MASS INDEX: 31.47 KG/M2 | HEART RATE: 83 BPM | DIASTOLIC BLOOD PRESSURE: 75 MMHG | WEIGHT: 195 LBS | SYSTOLIC BLOOD PRESSURE: 114 MMHG | OXYGEN SATURATION: 98 %

## 2024-12-16 DIAGNOSIS — L72.0 EPIDERMAL CYST: ICD-10-CM

## 2024-12-16 PROCEDURE — 11422 EXC H-F-NK-SP B9+MARG 1.1-2: CPT

## 2024-12-16 NOTE — ADDENDUM
[FreeTextEntry1] : All medical record entries were at my direction and personally dictated by me (Dr. Kylee Chavira). I have reviewed the chart and agree that the record accurately reflects my personal performance of the history, physical exam, assessment and plan. The appearance of the mass on excision was softer and atypical for an inclusion cyst. This could potentially be a small lipoma or a superficial node (it was superficial to the platysma). Will follow up the pathology report.

## 2024-12-16 NOTE — PROCEDURE
[Nl] : Local Anesthesia: (1% Lidocaine with 1:100,000 epinephrine) [FreeTextEntry1] : Inclusion cyst [FreeTextEntry2] : Excision of right neck cyst, 1.5 cm  [FreeTextEntry4] : scant [FreeTextEntry5] : none [FreeTextEntry6] :  Following informed consent and marking, the right neck was prepped with an alcohol swab and infiltrated with 1% lidocaine with epi. Time was allowed to pass for vasoconstriction.   A 2 cm incision was made with a 15 blade scalpel. This was carried through the dermis into the subcutaneous tissue. Blunt and sharp scissor dissection was used to excise the mass.   The wound was carefully examined for hemostasis.   The closure was then begun. The subcutaneous tissue was closed using interrupted 4-0 monocryl suture. The skin was then closed with a running subcuticular 4-0 monocryl suture.  The incision was then dressed with steri-strips.  Wound care instructions were given. May shower in one day.  Leave steri-strips in place.  Follow up in one week.

## 2024-12-16 NOTE — ASSESSMENT
[FreeTextEntry1] : 58 y/o male for excision of cyst from right neck. See above procedure note. All questions and concerns addressed. Plan and patient status as per Dr Chavira.

## 2024-12-16 NOTE — REASON FOR VISIT
[Procedure: _________] : a [unfilled] procedure visit [FreeTextEntry1] : Patient presents today for a removal of a cyst on his neck. Patient states he is doing well, no complaints at this time.

## 2024-12-23 ENCOUNTER — APPOINTMENT (OUTPATIENT)
Dept: PLASTIC SURGERY | Facility: CLINIC | Age: 59
End: 2024-12-23
Payer: COMMERCIAL

## 2024-12-23 VITALS
SYSTOLIC BLOOD PRESSURE: 110 MMHG | BODY MASS INDEX: 31.47 KG/M2 | WEIGHT: 195 LBS | TEMPERATURE: 97.3 F | DIASTOLIC BLOOD PRESSURE: 70 MMHG | OXYGEN SATURATION: 98 % | HEART RATE: 77 BPM

## 2024-12-23 DIAGNOSIS — Z09 ENCOUNTER FOR FOLLOW-UP EXAMINATION AFTER COMPLETED TREATMENT FOR CONDITIONS OTHER THAN MALIGNANT NEOPLASM: ICD-10-CM

## 2024-12-23 LAB — CORE LAB BIOPSY: NORMAL

## 2024-12-23 PROCEDURE — 99024 POSTOP FOLLOW-UP VISIT: CPT

## 2024-12-23 NOTE — ASSESSMENT
[FreeTextEntry1] : Doing very well s/p excision of lipoma of the right neck. Pathology reviewed with pt (lipoma, not a cyst). Pt was given written scar care instructions that were reviewed verbally, as follows:  Scars tend to heal fairly quickly (in 4-14 days depending on the site), then mature slowly over time. The vast majority of improvement occurs in the first 2-3 months, with the pink/red scar becoming softer, more pliable and lighter in color (depending upon skin tone). At 6 months, the scar appears lighter and at approximately one year, the scar reaches maximum improvement (with a range from 9-18 months). Scar care should include skin moisturizer, sunscreen, and if desired, silicone gel.  Silicone gel use should begin after all scabs and crust are gone and the scars are smooth and dry, usually about 3 weeks. The silicone gel should be used twice a day for a treatment course of three months to achieve benefit. Sunscreen and/or makeup can be applied after the silicone gel has been applied and allowed to dry.  Follow up in three months.

## 2024-12-23 NOTE — REASON FOR VISIT
[Follow-Up: _____] : a [unfilled] follow-up visit [FreeTextEntry1] : Patient presents for a follow up from his procedure last week, states he has no problems or concerns at this time and he is doing well.

## 2024-12-23 NOTE — HISTORY OF PRESENT ILLNESS
[FreeTextEntry1] : Pt reports he is surprised that the steristrips haven't fallen off already. He reports some itching, but no pain.

## 2024-12-23 NOTE — PHYSICAL EXAM
[NI] : Normal [de-identified] : Right neck site healing well. Steristrips removed Suture ends trimmed.

## 2025-01-03 LAB — HBA1C MFR BLD HPLC: 5.6

## 2025-01-06 ENCOUNTER — NON-APPOINTMENT (OUTPATIENT)
Age: 60
End: 2025-01-06

## 2025-01-06 ENCOUNTER — APPOINTMENT (OUTPATIENT)
Dept: CARDIOLOGY | Facility: CLINIC | Age: 60
End: 2025-01-06
Payer: COMMERCIAL

## 2025-01-06 VITALS
SYSTOLIC BLOOD PRESSURE: 124 MMHG | WEIGHT: 193 LBS | DIASTOLIC BLOOD PRESSURE: 70 MMHG | OXYGEN SATURATION: 97 % | HEART RATE: 113 BPM | BODY MASS INDEX: 31.15 KG/M2

## 2025-01-06 DIAGNOSIS — R07.89 OTHER CHEST PAIN: ICD-10-CM

## 2025-01-06 DIAGNOSIS — E66.811 OBESITY, CLASS 1: ICD-10-CM

## 2025-01-06 DIAGNOSIS — E78.5 HYPERLIPIDEMIA, UNSPECIFIED: ICD-10-CM

## 2025-01-06 DIAGNOSIS — I10 ESSENTIAL (PRIMARY) HYPERTENSION: ICD-10-CM

## 2025-01-06 DIAGNOSIS — R73.02 IMPAIRED GLUCOSE TOLERANCE (ORAL): ICD-10-CM

## 2025-01-06 PROCEDURE — 99215 OFFICE O/P EST HI 40 MIN: CPT

## 2025-01-06 PROCEDURE — 93000 ELECTROCARDIOGRAM COMPLETE: CPT

## 2025-01-06 RX ORDER — OMEPRAZOLE 20 MG/1
20 CAPSULE, DELAYED RELEASE ORAL
Qty: 30 | Refills: 1 | Status: ACTIVE | COMMUNITY

## 2025-01-06 NOTE — HISTORY OF PRESENT ILLNESS
[FreeTextEntry1] : 59 years old  gentleman with history of BPH, hiatus hernia, history of nephrolithiasis, hypertension, dyslipidemia, hypothyroidism , GERD/hiatal hernia came for cardiac follow-up  He has been burping a lot with some chest pain, intermittently he does have symptoms of GERD but he does not take PPI.  He denies PND, orthopnea, diaphoresis, dizziness, palpitation, melena.  His work involves a lot of physical exertion.  He is compliant medication glucose diet.  November 30, 2022    -carotid Doppler did not show significant stenosis.   No prior history of CHF, MI, syncope.

## 2025-01-06 NOTE — ASSESSMENT
[FreeTextEntry1] : Hypertension -low-salt diet, well controlled, continue medication.\par  \par  Dyslipidemia -low-cholesterol diet, continue medications; lipid panel not meeting NCP goal.  I offered him fenofibrate but he opted not to go for it.  He stated he will go for diet as well as will reduce his weight and repeat lipid panel.\par  \par  Chest pain -rule out CAD;   echocardiography confirmed normal LV size, LV wall motion, LVEF;  stress test did not show inducible symptoms/CAD.  No new medication at this point.  Reassurance, no further invasive testing.\par  \par  GERD -avoid citrus foods/juices, avoid eating 3 hours prior to going to bed, continue PPI,\par  \par  Decreased carotid upstroke -carotid Doppler did not show signal stenosis\par  \par  Risk factor monitoring has been discussed with him at great length include regular walking, compliance to medication, low-cholesterol/low-salt diet.  He will be reevaluated by me after cardiac testing  In 6 months.

## 2025-01-06 NOTE — PHYSICAL EXAM

## 2025-01-24 ENCOUNTER — APPOINTMENT (OUTPATIENT)
Dept: CARDIOLOGY | Facility: CLINIC | Age: 60
End: 2025-01-24
Payer: COMMERCIAL

## 2025-01-24 PROCEDURE — 93880 EXTRACRANIAL BILAT STUDY: CPT

## 2025-01-24 PROCEDURE — 93306 TTE W/DOPPLER COMPLETE: CPT

## 2025-02-05 ENCOUNTER — APPOINTMENT (OUTPATIENT)
Dept: CARDIOLOGY | Facility: CLINIC | Age: 60
End: 2025-02-05
Payer: COMMERCIAL

## 2025-02-05 PROCEDURE — 93015 CV STRESS TEST SUPVJ I&R: CPT

## 2025-02-05 PROCEDURE — A9502: CPT | Mod: JZ

## 2025-02-05 PROCEDURE — 78452 HT MUSCLE IMAGE SPECT MULT: CPT

## 2025-02-11 ENCOUNTER — APPOINTMENT (OUTPATIENT)
Dept: CARDIOLOGY | Facility: CLINIC | Age: 60
End: 2025-02-11
Payer: COMMERCIAL

## 2025-02-11 VITALS
DIASTOLIC BLOOD PRESSURE: 62 MMHG | HEART RATE: 75 BPM | SYSTOLIC BLOOD PRESSURE: 112 MMHG | WEIGHT: 192 LBS | OXYGEN SATURATION: 95 % | BODY MASS INDEX: 30.99 KG/M2

## 2025-02-11 DIAGNOSIS — N13.8 BENIGN PROSTATIC HYPERPLASIA WITH LOWER URINARY TRACT SYMPMS: ICD-10-CM

## 2025-02-11 DIAGNOSIS — Z87.442 PERSONAL HISTORY OF URINARY CALCULI: ICD-10-CM

## 2025-02-11 DIAGNOSIS — R07.89 OTHER CHEST PAIN: ICD-10-CM

## 2025-02-11 DIAGNOSIS — E03.9 HYPOTHYROIDISM, UNSPECIFIED: ICD-10-CM

## 2025-02-11 DIAGNOSIS — Z87.898 PERSONAL HISTORY OF OTHER SPECIFIED CONDITIONS: ICD-10-CM

## 2025-02-11 DIAGNOSIS — N40.1 BENIGN PROSTATIC HYPERPLASIA WITH LOWER URINARY TRACT SYMPMS: ICD-10-CM

## 2025-02-11 DIAGNOSIS — E78.5 HYPERLIPIDEMIA, UNSPECIFIED: ICD-10-CM

## 2025-02-11 DIAGNOSIS — I10 ESSENTIAL (PRIMARY) HYPERTENSION: ICD-10-CM

## 2025-02-11 DIAGNOSIS — L72.0 EPIDERMAL CYST: ICD-10-CM

## 2025-02-11 DIAGNOSIS — Z87.438 PERSONAL HISTORY OF OTHER DISEASES OF MALE GENITAL ORGANS: ICD-10-CM

## 2025-02-11 DIAGNOSIS — E66.811 OBESITY, CLASS 1: ICD-10-CM

## 2025-02-11 DIAGNOSIS — Z09 ENCOUNTER FOR FOLLOW-UP EXAMINATION AFTER COMPLETED TREATMENT FOR CONDITIONS OTHER THAN MALIGNANT NEOPLASM: ICD-10-CM

## 2025-02-11 DIAGNOSIS — N52.9 MALE ERECTILE DYSFUNCTION, UNSPECIFIED: ICD-10-CM

## 2025-02-11 PROCEDURE — G2211 COMPLEX E/M VISIT ADD ON: CPT | Mod: NC

## 2025-02-11 PROCEDURE — 99214 OFFICE O/P EST MOD 30 MIN: CPT

## 2025-02-11 RX ORDER — ROSUVASTATIN CALCIUM 20 MG/1
20 TABLET, FILM COATED ORAL DAILY
Qty: 90 | Refills: 3 | Status: ACTIVE | COMMUNITY
Start: 2025-02-11 | End: 1900-01-01

## 2025-03-12 ENCOUNTER — APPOINTMENT (OUTPATIENT)
Dept: PLASTIC SURGERY | Facility: CLINIC | Age: 60
End: 2025-03-12
Payer: COMMERCIAL

## 2025-03-12 VITALS
HEART RATE: 83 BPM | BODY MASS INDEX: 31.66 KG/M2 | HEIGHT: 66 IN | WEIGHT: 197 LBS | OXYGEN SATURATION: 97 % | DIASTOLIC BLOOD PRESSURE: 68 MMHG | SYSTOLIC BLOOD PRESSURE: 108 MMHG | TEMPERATURE: 97.7 F

## 2025-03-12 DIAGNOSIS — L72.0 EPIDERMAL CYST: ICD-10-CM

## 2025-03-12 DIAGNOSIS — Z86.018 PERSONAL HISTORY OF OTHER BENIGN NEOPLASM: ICD-10-CM

## 2025-03-12 DIAGNOSIS — L90.5 SCAR CONDITIONS AND FIBROSIS OF SKIN: ICD-10-CM

## 2025-03-12 PROCEDURE — 99212 OFFICE O/P EST SF 10 MIN: CPT

## 2025-03-12 RX ORDER — CHROMIUM 200 MCG
TABLET ORAL
Refills: 0 | Status: ACTIVE | COMMUNITY

## 2025-03-12 NOTE — PHYSICAL EXAM
[NI] : Normal [de-identified] : Right neck site healed very well. The scar is fading nicely and settling into the crease. [de-identified] : Right upper back scar well healed, slightly depressed.

## 2025-03-12 NOTE — ASSESSMENT
[FreeTextEntry1] : Doing well s/p excision of right back lipoma in 2022 and right neck inclusion cyst in 12/2024. Continue silicone for another month and then use sunscreen. Follow up as needed. Call for any questions or concerns.

## 2025-03-12 NOTE — HISTORY OF PRESENT ILLNESS
[FreeTextEntry1] : Pt reports he is doing very well. Using silicone gel on the neck scar and feels that you can hardly see the scar. He did not use silicone on the back scar.

## 2025-05-02 ENCOUNTER — APPOINTMENT (OUTPATIENT)
Dept: UROLOGY | Facility: CLINIC | Age: 60
End: 2025-05-02
Payer: COMMERCIAL

## 2025-05-02 VITALS
HEIGHT: 66 IN | DIASTOLIC BLOOD PRESSURE: 70 MMHG | WEIGHT: 195 LBS | SYSTOLIC BLOOD PRESSURE: 106 MMHG | BODY MASS INDEX: 31.34 KG/M2 | HEART RATE: 93 BPM | TEMPERATURE: 97.3 F

## 2025-05-02 DIAGNOSIS — N52.9 MALE ERECTILE DYSFUNCTION, UNSPECIFIED: ICD-10-CM

## 2025-05-02 DIAGNOSIS — Z87.442 PERSONAL HISTORY OF URINARY CALCULI: ICD-10-CM

## 2025-05-02 DIAGNOSIS — N13.8 BENIGN PROSTATIC HYPERPLASIA WITH LOWER URINARY TRACT SYMPMS: ICD-10-CM

## 2025-05-02 DIAGNOSIS — N40.1 BENIGN PROSTATIC HYPERPLASIA WITH LOWER URINARY TRACT SYMPMS: ICD-10-CM

## 2025-05-02 LAB
APPEARANCE: CLEAR
BILIRUBIN URINE: NEGATIVE
BLOOD URINE: NEGATIVE
COLOR: YELLOW
GLUCOSE QUALITATIVE U: NEGATIVE
KETONES URINE: ABNORMAL
LEUKOCYTE ESTERASE URINE: NEGATIVE
NITRITE URINE: NEGATIVE
PH URINE: 7
PROTEIN URINE: ABNORMAL
SPECIFIC GRAVITY URINE: 1.01
UROBILINOGEN URINE: 0.2 (ref 0.2–?)

## 2025-05-02 PROCEDURE — G2211 COMPLEX E/M VISIT ADD ON: CPT | Mod: NC

## 2025-05-02 PROCEDURE — 99214 OFFICE O/P EST MOD 30 MIN: CPT

## 2025-05-02 NOTE — HISTORY OF PRESENT ILLNESS
[FreeTextEntry1] : Pt comes in bc of the feeling of incomplete emptying. Pt urinating every 15-20 minutes. Pt has had kidney stones in the past. Pt having pain at the tip of the penis.   Pt passed stone on Sunday and is now symptom free. CT scan was done after passing stone CT shows a 2mm left lower pole stone.   8/15/24 [facility] reviewed in detail with pt. Pt still drinks a considerable amount of milk  5/2/25 Pt comes in bc of kidney stones. Pt feels he may have a kidney stone. 10/23/24 [facility] reviewed with pt. Pt having frequent urination and dribbling with urination.

## 2025-05-02 NOTE — ASSESSMENT
[FreeTextEntry1] : Pt does not want imaging for a kidney stone he thinks he may be passing.   Plan UA culture decrease milk intake, as well as overall calcium, vit D, and sodium intake increase water intake c/w flomax c/w viagra PRN fu 2 weeks

## 2025-05-08 LAB
APPEARANCE: CLEAR
BACTERIA UR CULT: NORMAL
BACTERIA: NEGATIVE /HPF
BILIRUBIN URINE: NEGATIVE
BLOOD URINE: NEGATIVE
CAST: 0 /LPF
COLOR: YELLOW
EPITHELIAL CELLS: 0 /HPF
GLUCOSE QUALITATIVE U: NEGATIVE MG/DL
KETONES URINE: ABNORMAL MG/DL
LEUKOCYTE ESTERASE URINE: ABNORMAL
MICROSCOPIC-UA: NORMAL
NITRITE URINE: NEGATIVE
PH URINE: 7
PROTEIN URINE: NORMAL MG/DL
RED BLOOD CELLS URINE: 1 /HPF
SPECIFIC GRAVITY URINE: 1.02
UROBILINOGEN URINE: 0.2 MG/DL
WHITE BLOOD CELLS URINE: 0 /HPF

## 2025-08-09 LAB — HBA1C MFR BLD HPLC: 5.6

## 2025-08-12 ENCOUNTER — APPOINTMENT (OUTPATIENT)
Dept: CARDIOLOGY | Facility: CLINIC | Age: 60
End: 2025-08-12
Payer: COMMERCIAL

## 2025-08-12 VITALS
WEIGHT: 192 LBS | BODY MASS INDEX: 30.86 KG/M2 | OXYGEN SATURATION: 95 % | DIASTOLIC BLOOD PRESSURE: 66 MMHG | HEIGHT: 66 IN | HEART RATE: 89 BPM | SYSTOLIC BLOOD PRESSURE: 100 MMHG

## 2025-08-12 DIAGNOSIS — N40.1 BENIGN PROSTATIC HYPERPLASIA WITH LOWER URINARY TRACT SYMPMS: ICD-10-CM

## 2025-08-12 DIAGNOSIS — I10 ESSENTIAL (PRIMARY) HYPERTENSION: ICD-10-CM

## 2025-08-12 DIAGNOSIS — N13.8 BENIGN PROSTATIC HYPERPLASIA WITH LOWER URINARY TRACT SYMPMS: ICD-10-CM

## 2025-08-12 DIAGNOSIS — E66.811 OBESITY, CLASS 1: ICD-10-CM

## 2025-08-12 DIAGNOSIS — N52.9 MALE ERECTILE DYSFUNCTION, UNSPECIFIED: ICD-10-CM

## 2025-08-12 DIAGNOSIS — E78.5 HYPERLIPIDEMIA, UNSPECIFIED: ICD-10-CM

## 2025-08-12 DIAGNOSIS — R07.89 OTHER CHEST PAIN: ICD-10-CM

## 2025-08-12 DIAGNOSIS — E03.9 HYPOTHYROIDISM, UNSPECIFIED: ICD-10-CM

## 2025-08-12 DIAGNOSIS — Z87.442 PERSONAL HISTORY OF URINARY CALCULI: ICD-10-CM

## 2025-08-12 PROCEDURE — 99214 OFFICE O/P EST MOD 30 MIN: CPT

## 2025-08-12 PROCEDURE — G2211 COMPLEX E/M VISIT ADD ON: CPT | Mod: NC
